# Patient Record
Sex: FEMALE | Race: OTHER | HISPANIC OR LATINO | URBAN - METROPOLITAN AREA
[De-identification: names, ages, dates, MRNs, and addresses within clinical notes are randomized per-mention and may not be internally consistent; named-entity substitution may affect disease eponyms.]

---

## 2017-06-13 ENCOUNTER — OUTPATIENT (OUTPATIENT)
Dept: OUTPATIENT SERVICES | Facility: HOSPITAL | Age: 47
LOS: 1 days | Discharge: HOME | End: 2017-06-13

## 2017-06-28 DIAGNOSIS — I10 ESSENTIAL (PRIMARY) HYPERTENSION: ICD-10-CM

## 2017-06-28 DIAGNOSIS — H44.133 SYMPATHETIC UVEITIS, BILATERAL: ICD-10-CM

## 2017-06-28 DIAGNOSIS — D25.9 LEIOMYOMA OF UTERUS, UNSPECIFIED: ICD-10-CM

## 2017-06-28 DIAGNOSIS — D50.9 IRON DEFICIENCY ANEMIA, UNSPECIFIED: ICD-10-CM

## 2017-06-28 DIAGNOSIS — F43.23 ADJUSTMENT DISORDER WITH MIXED ANXIETY AND DEPRESSED MOOD: ICD-10-CM

## 2017-08-31 ENCOUNTER — OUTPATIENT (OUTPATIENT)
Dept: OUTPATIENT SERVICES | Facility: HOSPITAL | Age: 47
LOS: 1 days | Discharge: HOME | End: 2017-08-31

## 2017-08-31 DIAGNOSIS — D50.9 IRON DEFICIENCY ANEMIA, UNSPECIFIED: ICD-10-CM

## 2017-08-31 DIAGNOSIS — M54.12 RADICULOPATHY, CERVICAL REGION: ICD-10-CM

## 2017-08-31 DIAGNOSIS — I10 ESSENTIAL (PRIMARY) HYPERTENSION: ICD-10-CM

## 2017-08-31 DIAGNOSIS — F43.23 ADJUSTMENT DISORDER WITH MIXED ANXIETY AND DEPRESSED MOOD: ICD-10-CM

## 2017-12-28 ENCOUNTER — OUTPATIENT (OUTPATIENT)
Dept: OUTPATIENT SERVICES | Facility: HOSPITAL | Age: 47
LOS: 1 days | Discharge: HOME | End: 2017-12-28

## 2017-12-28 DIAGNOSIS — M33.20 POLYMYOSITIS, ORGAN INVOLVEMENT UNSPECIFIED: ICD-10-CM

## 2017-12-28 DIAGNOSIS — E06.3 AUTOIMMUNE THYROIDITIS: ICD-10-CM

## 2017-12-28 DIAGNOSIS — E55.9 VITAMIN D DEFICIENCY, UNSPECIFIED: ICD-10-CM

## 2017-12-28 DIAGNOSIS — D50.8 OTHER IRON DEFICIENCY ANEMIAS: ICD-10-CM

## 2017-12-28 DIAGNOSIS — M31.30 WEGENER'S GRANULOMATOSIS WITHOUT RENAL INVOLVEMENT: ICD-10-CM

## 2017-12-28 DIAGNOSIS — M32.10 SYSTEMIC LUPUS ERYTHEMATOSUS, ORGAN OR SYSTEM INVOLVEMENT UNSPECIFIED: ICD-10-CM

## 2017-12-28 DIAGNOSIS — F43.23 ADJUSTMENT DISORDER WITH MIXED ANXIETY AND DEPRESSED MOOD: ICD-10-CM

## 2017-12-28 DIAGNOSIS — D86.9 SARCOIDOSIS, UNSPECIFIED: ICD-10-CM

## 2017-12-28 DIAGNOSIS — M79.1 MYALGIA: ICD-10-CM

## 2017-12-28 DIAGNOSIS — M06.4 INFLAMMATORY POLYARTHROPATHY: ICD-10-CM

## 2017-12-28 DIAGNOSIS — R80.9 PROTEINURIA, UNSPECIFIED: ICD-10-CM

## 2017-12-28 DIAGNOSIS — R79.89 OTHER SPECIFIED ABNORMAL FINDINGS OF BLOOD CHEMISTRY: ICD-10-CM

## 2017-12-28 DIAGNOSIS — I10 ESSENTIAL (PRIMARY) HYPERTENSION: ICD-10-CM

## 2017-12-28 DIAGNOSIS — M60.9 MYOSITIS, UNSPECIFIED: ICD-10-CM

## 2017-12-28 DIAGNOSIS — D84.1 DEFECTS IN THE COMPLEMENT SYSTEM: ICD-10-CM

## 2017-12-28 DIAGNOSIS — E21.3 HYPERPARATHYROIDISM, UNSPECIFIED: ICD-10-CM

## 2018-06-16 ENCOUNTER — EMERGENCY (EMERGENCY)
Facility: HOSPITAL | Age: 48
LOS: 0 days | Discharge: HOME | End: 2018-06-17
Attending: EMERGENCY MEDICINE | Admitting: EMERGENCY MEDICINE

## 2018-06-16 VITALS
HEART RATE: 94 BPM | SYSTOLIC BLOOD PRESSURE: 139 MMHG | RESPIRATION RATE: 20 BRPM | TEMPERATURE: 98 F | DIASTOLIC BLOOD PRESSURE: 71 MMHG | OXYGEN SATURATION: 98 %

## 2018-06-16 DIAGNOSIS — M79.7 FIBROMYALGIA: ICD-10-CM

## 2018-06-16 DIAGNOSIS — R10.13 EPIGASTRIC PAIN: ICD-10-CM

## 2018-06-16 DIAGNOSIS — F43.10 POST-TRAUMATIC STRESS DISORDER, UNSPECIFIED: ICD-10-CM

## 2018-06-16 DIAGNOSIS — K75.4 AUTOIMMUNE HEPATITIS: ICD-10-CM

## 2018-06-16 DIAGNOSIS — E78.5 HYPERLIPIDEMIA, UNSPECIFIED: ICD-10-CM

## 2018-06-16 DIAGNOSIS — I10 ESSENTIAL (PRIMARY) HYPERTENSION: ICD-10-CM

## 2018-06-16 DIAGNOSIS — Z82.49 FAMILY HISTORY OF ISCHEMIC HEART DISEASE AND OTHER DISEASES OF THE CIRCULATORY SYSTEM: ICD-10-CM

## 2018-06-16 DIAGNOSIS — E66.9 OBESITY, UNSPECIFIED: ICD-10-CM

## 2018-06-16 DIAGNOSIS — R10.9 UNSPECIFIED ABDOMINAL PAIN: ICD-10-CM

## 2018-06-16 LAB
ALBUMIN SERPL ELPH-MCNC: 4 G/DL — SIGNIFICANT CHANGE UP (ref 3.5–5.2)
ALP SERPL-CCNC: 67 U/L — SIGNIFICANT CHANGE UP (ref 30–115)
ALT FLD-CCNC: 26 U/L — SIGNIFICANT CHANGE UP (ref 0–41)
ANION GAP SERPL CALC-SCNC: 16 MMOL/L — HIGH (ref 7–14)
APPEARANCE UR: CLEAR — SIGNIFICANT CHANGE UP
AST SERPL-CCNC: 40 U/L — SIGNIFICANT CHANGE UP (ref 0–41)
BASE EXCESS BLDV CALC-SCNC: 2.8 MMOL/L — HIGH (ref -2–2)
BASOPHILS # BLD AUTO: 0.02 K/UL — SIGNIFICANT CHANGE UP (ref 0–0.2)
BASOPHILS NFR BLD AUTO: 0.2 % — SIGNIFICANT CHANGE UP (ref 0–1)
BILIRUB DIRECT SERPL-MCNC: <0.2 MG/DL — SIGNIFICANT CHANGE UP (ref 0–0.2)
BILIRUB INDIRECT FLD-MCNC: >0.2 MG/DL — SIGNIFICANT CHANGE UP (ref 0.2–1.2)
BILIRUB SERPL-MCNC: 0.4 MG/DL — SIGNIFICANT CHANGE UP (ref 0.2–1.2)
BILIRUB UR-MCNC: NEGATIVE — SIGNIFICANT CHANGE UP
BUN SERPL-MCNC: 12 MG/DL — SIGNIFICANT CHANGE UP (ref 10–20)
CALCIUM SERPL-MCNC: 9 MG/DL — SIGNIFICANT CHANGE UP (ref 8.5–10.1)
CHLORIDE SERPL-SCNC: 98 MMOL/L — SIGNIFICANT CHANGE UP (ref 98–110)
CO2 SERPL-SCNC: 22 MMOL/L — SIGNIFICANT CHANGE UP (ref 17–32)
COLOR SPEC: YELLOW — SIGNIFICANT CHANGE UP
CREAT SERPL-MCNC: 0.5 MG/DL — LOW (ref 0.7–1.5)
DIFF PNL FLD: (no result)
EOSINOPHIL # BLD AUTO: 0.22 K/UL — SIGNIFICANT CHANGE UP (ref 0–0.7)
EOSINOPHIL NFR BLD AUTO: 2 % — SIGNIFICANT CHANGE UP (ref 0–8)
GLUCOSE SERPL-MCNC: 88 MG/DL — SIGNIFICANT CHANGE UP (ref 70–99)
GLUCOSE UR QL: NEGATIVE MG/DL — SIGNIFICANT CHANGE UP
HCG UR QL: NEGATIVE — SIGNIFICANT CHANGE UP
HCO3 BLDV-SCNC: 27 MMOL/L — SIGNIFICANT CHANGE UP (ref 22–29)
HCT VFR BLD CALC: 36.7 % — LOW (ref 37–47)
HGB BLD-MCNC: 12.4 G/DL — SIGNIFICANT CHANGE UP (ref 12–16)
IMM GRANULOCYTES NFR BLD AUTO: 0.4 % — HIGH (ref 0.1–0.3)
KETONES UR-MCNC: NEGATIVE — SIGNIFICANT CHANGE UP
LACTATE BLDV-MCNC: 1.1 MMOL/L — SIGNIFICANT CHANGE UP (ref 0.5–1.6)
LEUKOCYTE ESTERASE UR-ACNC: NEGATIVE — SIGNIFICANT CHANGE UP
LYMPHOCYTES # BLD AUTO: 28.3 % — SIGNIFICANT CHANGE UP (ref 20.5–51.1)
LYMPHOCYTES # BLD AUTO: 3.1 K/UL — SIGNIFICANT CHANGE UP (ref 1.2–3.4)
MCHC RBC-ENTMCNC: 28.8 PG — SIGNIFICANT CHANGE UP (ref 27–31)
MCHC RBC-ENTMCNC: 33.8 G/DL — SIGNIFICANT CHANGE UP (ref 32–37)
MCV RBC AUTO: 85.3 FL — SIGNIFICANT CHANGE UP (ref 81–99)
MONOCYTES # BLD AUTO: 0.71 K/UL — HIGH (ref 0.1–0.6)
MONOCYTES NFR BLD AUTO: 6.5 % — SIGNIFICANT CHANGE UP (ref 1.7–9.3)
NEUTROPHILS # BLD AUTO: 6.87 K/UL — HIGH (ref 1.4–6.5)
NEUTROPHILS NFR BLD AUTO: 62.6 % — SIGNIFICANT CHANGE UP (ref 42.2–75.2)
NITRITE UR-MCNC: NEGATIVE — SIGNIFICANT CHANGE UP
NRBC # BLD: 0 /100 WBCS — SIGNIFICANT CHANGE UP (ref 0–0)
PCO2 BLDV: 40 MMHG — LOW (ref 41–51)
PH BLDV: 7.44 — HIGH (ref 7.26–7.43)
PH UR: 6.5 — SIGNIFICANT CHANGE UP (ref 5–8)
PLATELET # BLD AUTO: 299 K/UL — SIGNIFICANT CHANGE UP (ref 130–400)
PO2 BLDV: 58 MMHG — HIGH (ref 20–40)
POTASSIUM SERPL-MCNC: 4.5 MMOL/L — SIGNIFICANT CHANGE UP (ref 3.5–5)
POTASSIUM SERPL-SCNC: 4.5 MMOL/L — SIGNIFICANT CHANGE UP (ref 3.5–5)
PROT SERPL-MCNC: 7.2 G/DL — SIGNIFICANT CHANGE UP (ref 6–8)
PROT UR-MCNC: NEGATIVE MG/DL — SIGNIFICANT CHANGE UP
RBC # BLD: 4.3 M/UL — SIGNIFICANT CHANGE UP (ref 4.2–5.4)
RBC # FLD: 13.9 % — SIGNIFICANT CHANGE UP (ref 11.5–14.5)
SAO2 % BLDV: 92 % — SIGNIFICANT CHANGE UP
SODIUM SERPL-SCNC: 136 MMOL/L — SIGNIFICANT CHANGE UP (ref 135–146)
SP GR SPEC: 1.01 — SIGNIFICANT CHANGE UP (ref 1.01–1.03)
TROPONIN T SERPL-MCNC: <0.01 NG/ML — SIGNIFICANT CHANGE UP
UROBILINOGEN FLD QL: 0.2 MG/DL — SIGNIFICANT CHANGE UP (ref 0.2–0.2)
WBC # BLD: 10.96 K/UL — HIGH (ref 4.8–10.8)
WBC # FLD AUTO: 10.96 K/UL — HIGH (ref 4.8–10.8)

## 2018-06-16 RX ORDER — KETOROLAC TROMETHAMINE 30 MG/ML
15 SYRINGE (ML) INJECTION ONCE
Qty: 0 | Refills: 0 | Status: DISCONTINUED | OUTPATIENT
Start: 2018-06-16 | End: 2018-06-16

## 2018-06-16 RX ORDER — ONDANSETRON 8 MG/1
4 TABLET, FILM COATED ORAL ONCE
Qty: 0 | Refills: 0 | Status: COMPLETED | OUTPATIENT
Start: 2018-06-16 | End: 2018-06-16

## 2018-06-16 RX ORDER — FAMOTIDINE 10 MG/ML
20 INJECTION INTRAVENOUS ONCE
Qty: 0 | Refills: 0 | Status: COMPLETED | OUTPATIENT
Start: 2018-06-16 | End: 2018-06-16

## 2018-06-16 RX ORDER — METOCLOPRAMIDE HCL 10 MG
10 TABLET ORAL ONCE
Qty: 0 | Refills: 0 | Status: COMPLETED | OUTPATIENT
Start: 2018-06-16 | End: 2018-06-16

## 2018-06-16 RX ORDER — SODIUM CHLORIDE 9 MG/ML
1000 INJECTION INTRAMUSCULAR; INTRAVENOUS; SUBCUTANEOUS ONCE
Qty: 0 | Refills: 0 | Status: COMPLETED | OUTPATIENT
Start: 2018-06-16 | End: 2018-06-16

## 2018-06-16 RX ADMIN — SODIUM CHLORIDE 1000 MILLILITER(S): 9 INJECTION INTRAMUSCULAR; INTRAVENOUS; SUBCUTANEOUS at 19:55

## 2018-06-16 RX ADMIN — FAMOTIDINE 20 MILLIGRAM(S): 10 INJECTION INTRAVENOUS at 19:55

## 2018-06-16 RX ADMIN — Medication 15 MILLIGRAM(S): at 22:31

## 2018-06-16 RX ADMIN — Medication 10 MILLIGRAM(S): at 22:31

## 2018-06-16 RX ADMIN — Medication 15 MILLIGRAM(S): at 22:39

## 2018-06-16 RX ADMIN — ONDANSETRON 4 MILLIGRAM(S): 8 TABLET, FILM COATED ORAL at 19:55

## 2018-06-16 NOTE — ED PROVIDER NOTE - PHYSICAL EXAMINATION
Constitutional: Well developed, well nourished. NAD  Head: Normocephalic, atraumatic.  Eyes: PERRL, EOMI.  ENT: No nasal discharge. Mucous membranes dry.  Neck: Supple. Painless ROM.  Cardiovascular: Regular rate and rhythm.   Pulmonary: . Lungs clear to auscultation bilaterally.   Abdominal: Soft. + mild epigastric abdominal pain; no rebound, guarding or flank pain  Extremities. Pelvis stable. No lower extremity edema, symmetric calves.  Skin: No rashes, cyanosis.  Neuro: AAOx3. No focal neurological deficits.  Psych: Normal mood. Normal affect.

## 2018-06-16 NOTE — ED PROVIDER NOTE - CARE PLAN
Principal Discharge DX:	Chest pain, unspecified type  Secondary Diagnosis:	Cholelithiasis  Secondary Diagnosis:	Headache

## 2018-06-16 NOTE — ED ADULT TRIAGE NOTE - CHIEF COMPLAINT QUOTE
Pt c/o dry heaves x months that have been getting worse, headache and dizziness today, pain when swallowing x 1 week

## 2018-06-16 NOTE — ED ADULT NURSE NOTE - PMH
Autoimmune hepatitis    Chronic lower back pain    DJD (degenerative joint disease)    Fibromyalgia    HLD (hyperlipidemia)    HTN (hypertension)    PTSD (post-traumatic stress disorder)

## 2018-06-16 NOTE — ED PROVIDER NOTE - MEDICAL DECISION MAKING DETAILS
Pt with continued CP.  Possibly gastritis, but due to the continued pain and no recent stress test, will have her stay in EDOU for continued workup for low risk CP.

## 2018-06-16 NOTE — ED PROVIDER NOTE - ATTENDING CONTRIBUTION TO CARE
48 y/o F GERD, htn, fibromyalgia, chronic LBP, hchol, autoimmune hepatitis - 1 week of epigastric pain.  n/v and dizziness.  +headache. 48 y/o F GERD, htn, fibromyalgia, chronic LBP, hchol, autoimmune hepatitis - 1 week of epigastric pain.  n/v and dizziness today, felt like she was going to pass out.  +headache.  Pain radiates up into her chest. No sob, no cough. no fever.  Pt has been on nexium in the past, but has not been on it recently.  No h/o early MI in family  Pain is worse with drinking even something like water.  GI Aboudajade in the past.  EXAM: well appearing. NAD. s1s2, reg. CTAB. abd soft, nd, nt.  P: labs, ekg, cxr, RUQ US.  CT if pain continues.  GI meds.

## 2018-06-16 NOTE — ED PROVIDER NOTE - OBJECTIVE STATEMENT
47 yold female to Ed Pmhx Fibromyalgia, HTn Hld, autoimmune hepatitis, chronic LBP/DDD, uterine fibroids, Gerd c/o abdominal pain described as burning with n/v x 1 week; pain exacerbated with po intake and food seems to get stuck epigastric area; pt also c/o headache frontal x 1 day; pt denies fever, chills, melena, brpbp;

## 2018-06-16 NOTE — ED PROVIDER NOTE - NS ED ROS FT
Constitutional: No fever, chills.  Eyes: No visual changes.  ENT: No hearing changes.  Neck: No neck pain or stiffness.  Cardiovascular: No chest pain, palpitations, edema.  Pulmonary: No SOB, cough. No hemoptysis.  Abdominal:  see hPI  : No dysuria, frequency.  Neuro: see hpi  MS: No back pain. No calf pain/swelling.  Psych: No suicidal ideations.

## 2018-06-17 VITALS
RESPIRATION RATE: 18 BRPM | OXYGEN SATURATION: 98 % | DIASTOLIC BLOOD PRESSURE: 78 MMHG | TEMPERATURE: 98 F | SYSTOLIC BLOOD PRESSURE: 133 MMHG | HEART RATE: 83 BPM

## 2018-06-17 LAB
BACTERIA # UR AUTO: (no result) /HPF
CHOLEST SERPL-MCNC: 235 MG/DL — HIGH (ref 100–200)
CK MB CFR SERPL CALC: 1.5 NG/ML — SIGNIFICANT CHANGE UP (ref 0.6–6.3)
CK SERPL-CCNC: 76 U/L — SIGNIFICANT CHANGE UP (ref 0–225)
EPI CELLS # UR: (no result) /HPF
HDLC SERPL-MCNC: 37 MG/DL — LOW (ref 40–125)
LIPID PNL WITH DIRECT LDL SERPL: 174 MG/DL — HIGH (ref 4–129)
RBC CASTS # UR COMP ASSIST: (no result) /HPF
TOTAL CHOLESTEROL/HDL RATIO MEASUREMENT: 6.4 RATIO — HIGH (ref 4–5.5)
TRIGL SERPL-MCNC: 223 MG/DL — HIGH (ref 10–149)
TROPONIN T SERPL-MCNC: <0.01 NG/ML — SIGNIFICANT CHANGE UP
WBC UR QL: SIGNIFICANT CHANGE UP /HPF

## 2018-06-17 RX ORDER — ASPIRIN/CALCIUM CARB/MAGNESIUM 324 MG
325 TABLET ORAL ONCE
Qty: 0 | Refills: 0 | Status: COMPLETED | OUTPATIENT
Start: 2018-06-17 | End: 2018-06-17

## 2018-06-17 RX ORDER — METHOCARBAMOL 500 MG/1
500 TABLET, FILM COATED ORAL ONCE
Qty: 0 | Refills: 0 | Status: COMPLETED | OUTPATIENT
Start: 2018-06-17 | End: 2018-06-17

## 2018-06-17 RX ORDER — TRAZODONE HCL 50 MG
50 TABLET ORAL ONCE
Qty: 0 | Refills: 0 | Status: COMPLETED | OUTPATIENT
Start: 2018-06-17 | End: 2018-06-17

## 2018-06-17 RX ORDER — PRAZOSIN HCL 2 MG
1 CAPSULE ORAL ONCE
Qty: 0 | Refills: 0 | Status: COMPLETED | OUTPATIENT
Start: 2018-06-17 | End: 2018-06-17

## 2018-06-17 RX ORDER — OMEPRAZOLE 10 MG/1
1 CAPSULE, DELAYED RELEASE ORAL
Qty: 30 | Refills: 0
Start: 2018-06-17 | End: 2018-07-16

## 2018-06-17 RX ORDER — DIPHENHYDRAMINE HYDROCHLORIDE AND LIDOCAINE HYDROCHLORIDE AND ALUMINUM HYDROXIDE AND MAGNESIUM HYDRO
30 KIT ONCE
Qty: 0 | Refills: 0 | Status: COMPLETED | OUTPATIENT
Start: 2018-06-17 | End: 2018-06-17

## 2018-06-17 RX ADMIN — Medication 1 MILLIGRAM(S): at 04:26

## 2018-06-17 RX ADMIN — Medication 50 MILLIGRAM(S): at 04:26

## 2018-06-17 RX ADMIN — METHOCARBAMOL 500 MILLIGRAM(S): 500 TABLET, FILM COATED ORAL at 03:37

## 2018-06-17 RX ADMIN — Medication 325 MILLIGRAM(S): at 04:26

## 2018-06-17 RX ADMIN — DIPHENHYDRAMINE HYDROCHLORIDE AND LIDOCAINE HYDROCHLORIDE AND ALUMINUM HYDROXIDE AND MAGNESIUM HYDRO 30 MILLILITER(S): KIT at 03:37

## 2018-06-17 NOTE — ED ADULT NURSE REASSESSMENT NOTE - NS ED NURSE REASSESS COMMENT FT1
pt resting in bed, nad noted. Pt on continuous cardiac and o2 monitor. pt stated she feels relief of symptoms. poc explained to the pt, pt verb understanding. will continue to monitor.

## 2018-06-17 NOTE — ED CDU PROVIDER DISPOSITION NOTE - CLINICAL COURSE
48 yo woman h/o multiple medical problems including HTN, HLD, obesity, autoimmune hepatitis, fibromyalgia, PTSD/anxiety was placed in CDU for possible cardiac evaluation. Pt presented to the ED c/o epigastric pain with post prandial bloating that has been going on for several months, worse when she eats carbs. She also has been having a lot of "dry heaves" which are mostly associated with coughing. She does not regurgitate food or other stomach contents. Epigastric pain worsened over the past week, nonradiating, no associated constipation, diarrhea, urinary sx. No chest pain, no SOB. No exertional sx. Prior to coming to the ED pt c/o frontal HA and dizziness, which resolved spontaneously. Pt was eval in the ED with EKG, labs with cardiac enzymes, CXR, abd CT, and RUQ sonogram. All were unremarkable except for sonogram, which revealed gallstones but no cholecystitis. VS, exam as noted. Pt was monitored in CDU without incident, repeat EKG and enzymes unchanged. I spoke extensively with pt this morning and she gave the above history and strongly indicated that she feels sx are likely to be from GI. She does have a strong FH of CAD, but these sx do not sound cardiac in origin. She has a PMD and a GI (Dr Goodrich and Dr Hernandez). She understands that ddx includes GERD vs PUD vs esophageal structure vs other etiologies and that EGD is likely next step and agrees that cardiac workup seems unwarranted at this time. Omeprazole was prescribed. She will return to the ED for worsening sx.

## 2018-06-17 NOTE — ED ADULT NURSE REASSESSMENT NOTE - NS ED NURSE REASSESS COMMENT FT1
Pt assessed. AAO x 4. Pt resting comfortably in bed. No distress noted. Denies CP, SOB at this time. Pt on continuous cardiac and pulse ox monitoring. Comfort measures in place. Safety measures maintained. Will continue to monitor.

## 2019-06-24 PROBLEM — I10 ESSENTIAL (PRIMARY) HYPERTENSION: Chronic | Status: ACTIVE | Noted: 2018-06-17

## 2019-06-24 PROBLEM — E78.5 HYPERLIPIDEMIA, UNSPECIFIED: Chronic | Status: ACTIVE | Noted: 2018-06-17

## 2019-06-24 PROBLEM — F43.10 POST-TRAUMATIC STRESS DISORDER, UNSPECIFIED: Chronic | Status: ACTIVE | Noted: 2018-06-17

## 2019-06-24 PROBLEM — M19.90 UNSPECIFIED OSTEOARTHRITIS, UNSPECIFIED SITE: Chronic | Status: ACTIVE | Noted: 2018-06-17

## 2019-06-24 PROBLEM — M79.7 FIBROMYALGIA: Chronic | Status: ACTIVE | Noted: 2018-06-17

## 2019-07-01 ENCOUNTER — OUTPATIENT (OUTPATIENT)
Dept: OUTPATIENT SERVICES | Facility: HOSPITAL | Age: 49
LOS: 1 days | End: 2019-07-01
Payer: MEDICAID

## 2019-07-01 PROCEDURE — G9001: CPT

## 2019-07-11 DIAGNOSIS — Z71.89 OTHER SPECIFIED COUNSELING: ICD-10-CM

## 2019-07-11 PROBLEM — Z00.00 ENCOUNTER FOR PREVENTIVE HEALTH EXAMINATION: Status: ACTIVE | Noted: 2019-07-11

## 2019-07-15 ENCOUNTER — OUTPATIENT (OUTPATIENT)
Dept: OUTPATIENT SERVICES | Facility: HOSPITAL | Age: 49
LOS: 1 days | Discharge: HOME | End: 2019-07-15

## 2019-07-15 DIAGNOSIS — F43.23 ADJUSTMENT DISORDER WITH MIXED ANXIETY AND DEPRESSED MOOD: ICD-10-CM

## 2019-07-15 DIAGNOSIS — I10 ESSENTIAL (PRIMARY) HYPERTENSION: ICD-10-CM

## 2019-07-15 DIAGNOSIS — M54.12 RADICULOPATHY, CERVICAL REGION: ICD-10-CM

## 2019-07-15 DIAGNOSIS — M79.7 FIBROMYALGIA: ICD-10-CM

## 2019-07-15 DIAGNOSIS — M54.16 RADICULOPATHY, LUMBAR REGION: ICD-10-CM

## 2019-09-19 ENCOUNTER — OUTPATIENT (OUTPATIENT)
Dept: OUTPATIENT SERVICES | Facility: HOSPITAL | Age: 49
LOS: 1 days | Discharge: HOME | End: 2019-09-19

## 2019-09-19 ENCOUNTER — APPOINTMENT (OUTPATIENT)
Dept: OBGYN | Facility: CLINIC | Age: 49
End: 2019-09-19
Payer: MEDICAID

## 2019-09-19 VITALS
WEIGHT: 252 LBS | SYSTOLIC BLOOD PRESSURE: 138 MMHG | DIASTOLIC BLOOD PRESSURE: 88 MMHG | BODY MASS INDEX: 40.5 KG/M2 | HEIGHT: 66 IN

## 2019-09-19 DIAGNOSIS — E78.00 PURE HYPERCHOLESTEROLEMIA, UNSPECIFIED: ICD-10-CM

## 2019-09-19 DIAGNOSIS — D25.9 LEIOMYOMA OF UTERUS, UNSPECIFIED: ICD-10-CM

## 2019-09-19 DIAGNOSIS — F32.9 MAJOR DEPRESSIVE DISORDER, SINGLE EPISODE, UNSPECIFIED: ICD-10-CM

## 2019-09-19 DIAGNOSIS — K75.4 AUTOIMMUNE HEPATITIS: ICD-10-CM

## 2019-09-19 DIAGNOSIS — M79.7 FIBROMYALGIA: ICD-10-CM

## 2019-09-19 DIAGNOSIS — H20.9 UNSPECIFIED IRIDOCYCLITIS: ICD-10-CM

## 2019-09-19 DIAGNOSIS — I10 ESSENTIAL (PRIMARY) HYPERTENSION: ICD-10-CM

## 2019-09-19 DIAGNOSIS — F40.01 AGORAPHOBIA WITH PANIC DISORDER: ICD-10-CM

## 2019-09-19 DIAGNOSIS — F41.0 PANIC DISORDER [EPISODIC PAROXYSMAL ANXIETY]: ICD-10-CM

## 2019-09-19 PROCEDURE — 99204 OFFICE O/P NEW MOD 45 MIN: CPT

## 2019-09-19 NOTE — PHYSICAL EXAM
[Awake] : awake [Alert] : alert [Acute Distress] : no acute distress [Mass] : no breast mass [Nipple Discharge] : no nipple discharge [Axillary LAD] : no axillary lymphadenopathy [Soft] : soft [Tender] : non tender [Oriented x3] : oriented to person, place, and time [Normal] : cervix [No Bleeding] : there was no active vaginal bleeding [Enlarged ___ wks] : enlarged [unfilled] ~Uweeks [Uterine Adnexae] : were not tender and not enlarged [FreeTextEntry7] : Difficult to examine due to body habitus.

## 2019-09-22 LAB
ALBUMIN SERPL ELPH-MCNC: 4.2 G/DL
ALP BLD-CCNC: 87 U/L
ALT SERPL-CCNC: 13 U/L
ANION GAP SERPL CALC-SCNC: 14 MMOL/L
AST SERPL-CCNC: 14 U/L
BASOPHILS # BLD AUTO: 0.03 K/UL
BASOPHILS NFR BLD AUTO: 0.4 %
BILIRUB SERPL-MCNC: 0.4 MG/DL
BUN SERPL-MCNC: 11 MG/DL
CALCIUM SERPL-MCNC: 9.1 MG/DL
CHLORIDE SERPL-SCNC: 101 MMOL/L
CO2 SERPL-SCNC: 23 MMOL/L
CREAT SERPL-MCNC: 0.6 MG/DL
EOSINOPHIL # BLD AUTO: 0.19 K/UL
EOSINOPHIL NFR BLD AUTO: 2.3 %
ESTRADIOL SERPL-MCNC: 244 PG/ML
FSH SERPL-MCNC: 4.2 IU/L
GLUCOSE SERPL-MCNC: 92 MG/DL
HCT VFR BLD CALC: 36.2 %
HGB BLD-MCNC: 11 G/DL
IMM GRANULOCYTES NFR BLD AUTO: 0.2 %
LYMPHOCYTES # BLD AUTO: 2.35 K/UL
LYMPHOCYTES NFR BLD AUTO: 28 %
MAN DIFF?: NORMAL
MCHC RBC-ENTMCNC: 25 PG
MCHC RBC-ENTMCNC: 30.4 G/DL
MCV RBC AUTO: 82.3 FL
MONOCYTES # BLD AUTO: 0.38 K/UL
MONOCYTES NFR BLD AUTO: 4.5 %
NEUTROPHILS # BLD AUTO: 5.42 K/UL
NEUTROPHILS NFR BLD AUTO: 64.6 %
PLATELET # BLD AUTO: 372 K/UL
POTASSIUM SERPL-SCNC: 4.1 MMOL/L
PROLACTIN SERPL-MCNC: 9.7 NG/ML
PROT SERPL-MCNC: 7.2 G/DL
RBC # BLD: 4.4 M/UL
RBC # FLD: 15.8 %
SODIUM SERPL-SCNC: 138 MMOL/L
T4 FREE SERPL-MCNC: 0.9 NG/DL
TSH SERPL-ACNC: 0.89 UIU/ML
WBC # FLD AUTO: 8.39 K/UL

## 2019-09-23 LAB — ANA SER IF-ACNC: NEGATIVE

## 2019-10-01 LAB — HPV HIGH+LOW RISK DNA PNL CVX: NOT DETECTED

## 2019-10-13 ENCOUNTER — FORM ENCOUNTER (OUTPATIENT)
Age: 49
End: 2019-10-13

## 2019-10-14 ENCOUNTER — OUTPATIENT (OUTPATIENT)
Dept: OUTPATIENT SERVICES | Facility: HOSPITAL | Age: 49
LOS: 1 days | Discharge: HOME | End: 2019-10-14
Payer: MEDICAID

## 2019-10-14 DIAGNOSIS — Z12.31 ENCOUNTER FOR SCREENING MAMMOGRAM FOR MALIGNANT NEOPLASM OF BREAST: ICD-10-CM

## 2019-10-14 DIAGNOSIS — D25.9 LEIOMYOMA OF UTERUS, UNSPECIFIED: ICD-10-CM

## 2019-10-14 PROCEDURE — 77067 SCR MAMMO BI INCL CAD: CPT | Mod: 26

## 2019-10-14 PROCEDURE — 77063 BREAST TOMOSYNTHESIS BI: CPT | Mod: 26

## 2019-10-14 PROCEDURE — 76856 US EXAM PELVIC COMPLETE: CPT | Mod: 26

## 2019-10-14 PROCEDURE — 76830 TRANSVAGINAL US NON-OB: CPT | Mod: 26

## 2019-10-17 ENCOUNTER — APPOINTMENT (OUTPATIENT)
Dept: OBGYN | Facility: CLINIC | Age: 49
End: 2019-10-17
Payer: MEDICAID

## 2019-10-17 DIAGNOSIS — D50.8 OTHER IRON DEFICIENCY ANEMIAS: ICD-10-CM

## 2019-10-17 PROCEDURE — 99213 OFFICE O/P EST LOW 20 MIN: CPT

## 2019-10-24 ENCOUNTER — OUTPATIENT (OUTPATIENT)
Dept: OUTPATIENT SERVICES | Facility: HOSPITAL | Age: 49
LOS: 1 days | Discharge: HOME | End: 2019-10-24
Payer: MEDICAID

## 2019-10-24 VITALS
DIASTOLIC BLOOD PRESSURE: 80 MMHG | TEMPERATURE: 98 F | SYSTOLIC BLOOD PRESSURE: 140 MMHG | OXYGEN SATURATION: 97 % | HEART RATE: 76 BPM | WEIGHT: 253.53 LBS | HEIGHT: 65 IN | RESPIRATION RATE: 16 BRPM

## 2019-10-24 DIAGNOSIS — N92.0 EXCESSIVE AND FREQUENT MENSTRUATION WITH REGULAR CYCLE: ICD-10-CM

## 2019-10-24 DIAGNOSIS — Z98.891 HISTORY OF UTERINE SCAR FROM PREVIOUS SURGERY: Chronic | ICD-10-CM

## 2019-10-24 DIAGNOSIS — Z01.818 ENCOUNTER FOR OTHER PREPROCEDURAL EXAMINATION: ICD-10-CM

## 2019-10-24 DIAGNOSIS — Z98.890 OTHER SPECIFIED POSTPROCEDURAL STATES: Chronic | ICD-10-CM

## 2019-10-24 DIAGNOSIS — D25.9 LEIOMYOMA OF UTERUS, UNSPECIFIED: ICD-10-CM

## 2019-10-24 LAB
APPEARANCE UR: CLEAR — SIGNIFICANT CHANGE UP
BACTERIA # UR AUTO: NEGATIVE — SIGNIFICANT CHANGE UP
BILIRUB UR-MCNC: NEGATIVE — SIGNIFICANT CHANGE UP
COLOR SPEC: YELLOW — SIGNIFICANT CHANGE UP
DIFF PNL FLD: ABNORMAL
EPI CELLS # UR: 1 /HPF — SIGNIFICANT CHANGE UP (ref 0–5)
GLUCOSE UR QL: NEGATIVE — SIGNIFICANT CHANGE UP
HYALINE CASTS # UR AUTO: 0 /LPF — SIGNIFICANT CHANGE UP (ref 0–7)
KETONES UR-MCNC: SIGNIFICANT CHANGE UP
LEUKOCYTE ESTERASE UR-ACNC: NEGATIVE — SIGNIFICANT CHANGE UP
NITRITE UR-MCNC: NEGATIVE — SIGNIFICANT CHANGE UP
PH UR: 6 — SIGNIFICANT CHANGE UP (ref 5–8)
PROT UR-MCNC: SIGNIFICANT CHANGE UP
RBC CASTS # UR COMP ASSIST: 10 /HPF — HIGH (ref 0–4)
SP GR SPEC: 1.03 — HIGH (ref 1.01–1.02)
UROBILINOGEN FLD QL: SIGNIFICANT CHANGE UP
WBC UR QL: 1 /HPF — SIGNIFICANT CHANGE UP (ref 0–5)

## 2019-10-24 PROCEDURE — 93010 ELECTROCARDIOGRAM REPORT: CPT

## 2019-10-24 NOTE — H&P PST ADULT - HISTORY OF PRESENT ILLNESS
48YR OLD FEMALE STATES WAS DIAGNOSED WITH FIBROID UTERUS-HAS BEEN HAVING PELVIC PAIN AND HEAVY MENSTRUAL BLEEDING -PRESENTS FOR D&C AND ENDOMETRIAL BIOPSY DUE TO HER FAMILY HISTORY. IS ALSO SCHEDULED FOR "RT HAND SURGERY" DUE TO LTD FLEXION ON RT THUMB AND "POSSIBLE CARPAL TUNNEL"  RELEASE ON 11/4? RECENT LABS SEP 2019 IN HIE -WNL. Denies c/o CP, PALP, SOB, URI, FEVER, RASH OR UTI SYMPTOMS. Exercise melissa 1-2 flat blocks LTD by pain back, and LT LE-USES CANE. NEEDS ASSIST WITH TRANSFERS.

## 2019-10-24 NOTE — H&P PST ADULT - NSICDXFAMILYHX_GEN_ALL_CORE_FT
FAMILY HISTORY:  Mother  Still living? No  Family history of acute myocardial infarction, Age at diagnosis: 41-50    Sibling  Still living? Unknown  Family history of CHF (congestive heart failure), Age at diagnosis: Age Unknown    Aunt  Still living? Unknown  Family history of early CAD, Age at diagnosis: 51-60

## 2019-10-24 NOTE — H&P PST ADULT - NSICDXPASTMEDICALHX_GEN_ALL_CORE_FT
PAST MEDICAL HISTORY:  Anxiety PTSD    Autoimmune hepatitis IN REMISSION FOR AT LEAST 3-4YRS    Chronic neck and back pain     Depression     DJD (degenerative joint disease)     Fibromyalgia     HLD (hyperlipidemia)     HTN (hypertension)     Obesity BMI 42    PTSD (post-traumatic stress disorder)     Uveitis B/L WITH CATARACTS

## 2019-10-29 PROBLEM — E66.9 OBESITY, UNSPECIFIED: Chronic | Status: ACTIVE | Noted: 2019-10-24

## 2019-10-29 PROBLEM — H20.9 UNSPECIFIED IRIDOCYCLITIS: Chronic | Status: ACTIVE | Noted: 2019-10-24

## 2019-10-29 PROBLEM — K75.4 AUTOIMMUNE HEPATITIS: Chronic | Status: ACTIVE | Noted: 2018-06-17

## 2019-10-29 PROBLEM — M54.9 DORSALGIA, UNSPECIFIED: Chronic | Status: ACTIVE | Noted: 2019-10-24

## 2019-10-29 PROBLEM — F41.9 ANXIETY DISORDER, UNSPECIFIED: Chronic | Status: ACTIVE | Noted: 2019-10-24

## 2019-10-29 PROBLEM — F32.9 MAJOR DEPRESSIVE DISORDER, SINGLE EPISODE, UNSPECIFIED: Chronic | Status: ACTIVE | Noted: 2019-10-24

## 2019-11-01 NOTE — ASU PATIENT PROFILE, ADULT - PMH
Anxiety  PTSD  Autoimmune hepatitis  IN REMISSION FOR AT LEAST 3-4YRS  Chronic neck and back pain    Depression    DJD (degenerative joint disease)    Fibromyalgia    HLD (hyperlipidemia)    HTN (hypertension)    Obesity  BMI 42  PTSD (post-traumatic stress disorder)    Uveitis  B/L WITH CATARACTS

## 2019-11-04 ENCOUNTER — OUTPATIENT (OUTPATIENT)
Dept: OUTPATIENT SERVICES | Facility: HOSPITAL | Age: 49
LOS: 1 days | Discharge: HOME | End: 2019-11-04

## 2019-11-04 VITALS
HEART RATE: 86 BPM | OXYGEN SATURATION: 99 % | SYSTOLIC BLOOD PRESSURE: 119 MMHG | DIASTOLIC BLOOD PRESSURE: 72 MMHG | RESPIRATION RATE: 18 BRPM | TEMPERATURE: 98 F

## 2019-11-04 VITALS
RESPIRATION RATE: 18 BRPM | DIASTOLIC BLOOD PRESSURE: 63 MMHG | HEART RATE: 85 BPM | OXYGEN SATURATION: 100 % | TEMPERATURE: 98 F | WEIGHT: 253.53 LBS | HEIGHT: 65 IN | SYSTOLIC BLOOD PRESSURE: 138 MMHG

## 2019-11-04 DIAGNOSIS — Z98.891 HISTORY OF UTERINE SCAR FROM PREVIOUS SURGERY: Chronic | ICD-10-CM

## 2019-11-04 DIAGNOSIS — Z98.890 OTHER SPECIFIED POSTPROCEDURAL STATES: Chronic | ICD-10-CM

## 2019-11-06 DIAGNOSIS — M65.311 TRIGGER THUMB, RIGHT THUMB: ICD-10-CM

## 2019-11-06 DIAGNOSIS — Z88.0 ALLERGY STATUS TO PENICILLIN: ICD-10-CM

## 2019-11-06 DIAGNOSIS — M79.7 FIBROMYALGIA: ICD-10-CM

## 2019-11-06 DIAGNOSIS — F32.9 MAJOR DEPRESSIVE DISORDER, SINGLE EPISODE, UNSPECIFIED: ICD-10-CM

## 2019-11-06 DIAGNOSIS — E78.5 HYPERLIPIDEMIA, UNSPECIFIED: ICD-10-CM

## 2019-11-06 DIAGNOSIS — I10 ESSENTIAL (PRIMARY) HYPERTENSION: ICD-10-CM

## 2019-11-06 DIAGNOSIS — Z82.49 FAMILY HISTORY OF ISCHEMIC HEART DISEASE AND OTHER DISEASES OF THE CIRCULATORY SYSTEM: ICD-10-CM

## 2019-11-06 DIAGNOSIS — E66.9 OBESITY, UNSPECIFIED: ICD-10-CM

## 2019-11-06 DIAGNOSIS — M54.2 CERVICALGIA: ICD-10-CM

## 2019-11-06 DIAGNOSIS — M19.90 UNSPECIFIED OSTEOARTHRITIS, UNSPECIFIED SITE: ICD-10-CM

## 2019-11-06 DIAGNOSIS — H20.9 UNSPECIFIED IRIDOCYCLITIS: ICD-10-CM

## 2019-11-06 DIAGNOSIS — G89.29 OTHER CHRONIC PAIN: ICD-10-CM

## 2019-11-06 DIAGNOSIS — F41.9 ANXIETY DISORDER, UNSPECIFIED: ICD-10-CM

## 2019-11-06 DIAGNOSIS — Z87.891 PERSONAL HISTORY OF NICOTINE DEPENDENCE: ICD-10-CM

## 2019-11-06 DIAGNOSIS — F43.10 POST-TRAUMATIC STRESS DISORDER, UNSPECIFIED: ICD-10-CM

## 2019-11-06 DIAGNOSIS — M54.9 DORSALGIA, UNSPECIFIED: ICD-10-CM

## 2019-11-21 ENCOUNTER — OUTPATIENT (OUTPATIENT)
Dept: OUTPATIENT SERVICES | Facility: HOSPITAL | Age: 49
LOS: 1 days | Discharge: HOME | End: 2019-11-21

## 2019-11-21 VITALS
DIASTOLIC BLOOD PRESSURE: 70 MMHG | OXYGEN SATURATION: 98 % | HEIGHT: 65 IN | SYSTOLIC BLOOD PRESSURE: 121 MMHG | WEIGHT: 246.92 LBS | RESPIRATION RATE: 18 BRPM | TEMPERATURE: 98 F | HEART RATE: 77 BPM

## 2019-11-21 DIAGNOSIS — D25.9 LEIOMYOMA OF UTERUS, UNSPECIFIED: ICD-10-CM

## 2019-11-21 DIAGNOSIS — N92.0 EXCESSIVE AND FREQUENT MENSTRUATION WITH REGULAR CYCLE: ICD-10-CM

## 2019-11-21 DIAGNOSIS — Z98.891 HISTORY OF UTERINE SCAR FROM PREVIOUS SURGERY: Chronic | ICD-10-CM

## 2019-11-21 DIAGNOSIS — Z01.818 ENCOUNTER FOR OTHER PREPROCEDURAL EXAMINATION: ICD-10-CM

## 2019-11-21 DIAGNOSIS — Z98.890 OTHER SPECIFIED POSTPROCEDURAL STATES: Chronic | ICD-10-CM

## 2019-11-21 LAB
ALBUMIN SERPL ELPH-MCNC: 4.5 G/DL — SIGNIFICANT CHANGE UP (ref 3.5–5.2)
ALP SERPL-CCNC: 91 U/L — SIGNIFICANT CHANGE UP (ref 30–115)
ALT FLD-CCNC: 12 U/L — SIGNIFICANT CHANGE UP (ref 0–41)
ANION GAP SERPL CALC-SCNC: 14 MMOL/L — SIGNIFICANT CHANGE UP (ref 7–14)
APPEARANCE UR: CLEAR — SIGNIFICANT CHANGE UP
APTT BLD: 30.7 SEC — SIGNIFICANT CHANGE UP (ref 27–39.2)
AST SERPL-CCNC: 16 U/L — SIGNIFICANT CHANGE UP (ref 0–41)
BASOPHILS # BLD AUTO: 0.02 K/UL — SIGNIFICANT CHANGE UP (ref 0–0.2)
BASOPHILS NFR BLD AUTO: 0.2 % — SIGNIFICANT CHANGE UP (ref 0–1)
BILIRUB SERPL-MCNC: 0.3 MG/DL — SIGNIFICANT CHANGE UP (ref 0.2–1.2)
BILIRUB UR-MCNC: NEGATIVE — SIGNIFICANT CHANGE UP
BUN SERPL-MCNC: 10 MG/DL — SIGNIFICANT CHANGE UP (ref 10–20)
CALCIUM SERPL-MCNC: 9.1 MG/DL — SIGNIFICANT CHANGE UP (ref 8.5–10.1)
CHLORIDE SERPL-SCNC: 101 MMOL/L — SIGNIFICANT CHANGE UP (ref 98–110)
CO2 SERPL-SCNC: 23 MMOL/L — SIGNIFICANT CHANGE UP (ref 17–32)
COLOR SPEC: SIGNIFICANT CHANGE UP
CREAT SERPL-MCNC: 0.5 MG/DL — LOW (ref 0.7–1.5)
DIFF PNL FLD: SIGNIFICANT CHANGE UP
EOSINOPHIL # BLD AUTO: 0.16 K/UL — SIGNIFICANT CHANGE UP (ref 0–0.7)
EOSINOPHIL NFR BLD AUTO: 2 % — SIGNIFICANT CHANGE UP (ref 0–8)
GLUCOSE SERPL-MCNC: 86 MG/DL — SIGNIFICANT CHANGE UP (ref 70–99)
GLUCOSE UR QL: NEGATIVE — SIGNIFICANT CHANGE UP
HCT VFR BLD CALC: 34.6 % — LOW (ref 37–47)
HGB BLD-MCNC: 10.5 G/DL — LOW (ref 12–16)
IMM GRANULOCYTES NFR BLD AUTO: 0.1 % — SIGNIFICANT CHANGE UP (ref 0.1–0.3)
INR BLD: 1.08 RATIO — SIGNIFICANT CHANGE UP (ref 0.65–1.3)
KETONES UR-MCNC: NEGATIVE — SIGNIFICANT CHANGE UP
LEUKOCYTE ESTERASE UR-ACNC: NEGATIVE — SIGNIFICANT CHANGE UP
LYMPHOCYTES # BLD AUTO: 2.21 K/UL — SIGNIFICANT CHANGE UP (ref 1.2–3.4)
LYMPHOCYTES # BLD AUTO: 27.5 % — SIGNIFICANT CHANGE UP (ref 20.5–51.1)
MCHC RBC-ENTMCNC: 24.9 PG — LOW (ref 27–31)
MCHC RBC-ENTMCNC: 30.3 G/DL — LOW (ref 32–37)
MCV RBC AUTO: 82.2 FL — SIGNIFICANT CHANGE UP (ref 81–99)
MONOCYTES # BLD AUTO: 0.44 K/UL — SIGNIFICANT CHANGE UP (ref 0.1–0.6)
MONOCYTES NFR BLD AUTO: 5.5 % — SIGNIFICANT CHANGE UP (ref 1.7–9.3)
NEUTROPHILS # BLD AUTO: 5.19 K/UL — SIGNIFICANT CHANGE UP (ref 1.4–6.5)
NEUTROPHILS NFR BLD AUTO: 64.7 % — SIGNIFICANT CHANGE UP (ref 42.2–75.2)
NITRITE UR-MCNC: NEGATIVE — SIGNIFICANT CHANGE UP
NRBC # BLD: 0 /100 WBCS — SIGNIFICANT CHANGE UP (ref 0–0)
PH UR: 6.5 — SIGNIFICANT CHANGE UP (ref 5–8)
PLATELET # BLD AUTO: 349 K/UL — SIGNIFICANT CHANGE UP (ref 130–400)
POTASSIUM SERPL-MCNC: 4.2 MMOL/L — SIGNIFICANT CHANGE UP (ref 3.5–5)
POTASSIUM SERPL-SCNC: 4.2 MMOL/L — SIGNIFICANT CHANGE UP (ref 3.5–5)
PROT SERPL-MCNC: 7.7 G/DL — SIGNIFICANT CHANGE UP (ref 6–8)
PROT UR-MCNC: NEGATIVE — SIGNIFICANT CHANGE UP
PROTHROM AB SERPL-ACNC: 12.4 SEC — SIGNIFICANT CHANGE UP (ref 9.95–12.87)
RBC # BLD: 4.21 M/UL — SIGNIFICANT CHANGE UP (ref 4.2–5.4)
RBC # FLD: 17.4 % — HIGH (ref 11.5–14.5)
SODIUM SERPL-SCNC: 138 MMOL/L — SIGNIFICANT CHANGE UP (ref 135–146)
SP GR SPEC: 1.02 — SIGNIFICANT CHANGE UP (ref 1.01–1.02)
UROBILINOGEN FLD QL: SIGNIFICANT CHANGE UP
WBC # BLD: 8.03 K/UL — SIGNIFICANT CHANGE UP (ref 4.8–10.8)
WBC # FLD AUTO: 8.03 K/UL — SIGNIFICANT CHANGE UP (ref 4.8–10.8)

## 2019-11-21 NOTE — H&P PST ADULT - VENOUS THROMBOEMBOLISM HISTORY
Anesthesia Volume In Cc: 2.5 Add Intralesional Injection: No Total Volume (Ccs): 1 Size Of Lesion After Curettage: 0 Anesthesia Type: 1% lidocaine with epinephrine Consent was obtained from the patient. The risks, benefits and alternatives to therapy were discussed in detail. Specifically, the risks of infection, scarring, bleeding, prolonged wound healing, nerve injury, incomplete removal, allergy to anesthesia and recurrence were addressed. Alternatives to ED&C, such as: surgical removal and XRT were also discussed.  Prior to the procedure, the treatment site was clearly identified and confirmed by the patient. All components of Universal Protocol/PAUSE Rule completed. Cautery Type: cryotherapy Post-Care Instructions: I reviewed with the patient in detail post-care instructions. Patient is to keep the area dry for 48 hours, and not to engage in any swimming until the area is healed. Should the patient develop any fevers, chills, bleeding, severe pain patient will contact the office immediately. Number Of Curettages: 3 What Was Performed First?: Curettage Detail Level: Detailed Additional Information: (Optional): The wound was cleaned, and a pressure dressing was applied.  The patient received detailed post-op instructions. Concentration (Mg/Ml Or Millions Of Plaque Forming Units/Cc): 0.01 Bill As A Line Item Or As Units: Line Item (0) indicator not present

## 2019-11-21 NOTE — H&P PST ADULT - HISTORY OF PRESENT ILLNESS
Pt was initially scheduled to have above procedure done on 11/6/19 but had to postpone it bc she had right trigger finger surgery done on 11/4/19 and her recovery time took longer than expected. PATIENT CURRENTLY DENIES CHEST PAIN  SHORTNESS OF BREATH  PALPITATIONS,  DYSURIA, OR UPPER RESPIRATORY INFECTION IN PAST WEEK. HAD URI WHICH FULLY RESOLVED 3 DAYS AGO. SHE HAS AN EXERCISE  TOLERANCE  OF LESS THAN 1 FLIGHT OF STAIRS WITHOUT SHORTNESS OF BREATH. SHE HAS DIFFICULTY WITH AMBULATION D/T FIBROMYALGIA.

## 2019-12-06 ENCOUNTER — RESULT REVIEW (OUTPATIENT)
Age: 49
End: 2019-12-06

## 2019-12-06 ENCOUNTER — OUTPATIENT (OUTPATIENT)
Dept: OUTPATIENT SERVICES | Facility: HOSPITAL | Age: 49
LOS: 1 days | Discharge: HOME | End: 2019-12-06
Payer: MEDICAID

## 2019-12-06 VITALS
OXYGEN SATURATION: 98 % | HEART RATE: 85 BPM | TEMPERATURE: 98 F | DIASTOLIC BLOOD PRESSURE: 75 MMHG | RESPIRATION RATE: 22 BRPM | SYSTOLIC BLOOD PRESSURE: 130 MMHG

## 2019-12-06 VITALS
HEART RATE: 93 BPM | OXYGEN SATURATION: 98 % | HEIGHT: 65 IN | DIASTOLIC BLOOD PRESSURE: 86 MMHG | RESPIRATION RATE: 16 BRPM | WEIGHT: 246.92 LBS | SYSTOLIC BLOOD PRESSURE: 123 MMHG | TEMPERATURE: 99 F

## 2019-12-06 DIAGNOSIS — Z98.891 HISTORY OF UTERINE SCAR FROM PREVIOUS SURGERY: Chronic | ICD-10-CM

## 2019-12-06 DIAGNOSIS — Z98.890 OTHER SPECIFIED POSTPROCEDURAL STATES: Chronic | ICD-10-CM

## 2019-12-06 PROCEDURE — 58558 HYSTEROSCOPY BIOPSY: CPT

## 2019-12-06 PROCEDURE — 88305 TISSUE EXAM BY PATHOLOGIST: CPT | Mod: 26

## 2019-12-06 RX ORDER — OXYCODONE AND ACETAMINOPHEN 5; 325 MG/1; MG/1
1 TABLET ORAL ONCE
Refills: 0 | Status: DISCONTINUED | OUTPATIENT
Start: 2019-12-06 | End: 2019-12-06

## 2019-12-06 RX ORDER — HYDROMORPHONE HYDROCHLORIDE 2 MG/ML
0.4 INJECTION INTRAMUSCULAR; INTRAVENOUS; SUBCUTANEOUS
Refills: 0 | Status: DISCONTINUED | OUTPATIENT
Start: 2019-12-06 | End: 2019-12-06

## 2019-12-06 RX ORDER — SODIUM CHLORIDE 9 MG/ML
1000 INJECTION, SOLUTION INTRAVENOUS
Refills: 0 | Status: DISCONTINUED | OUTPATIENT
Start: 2019-12-06 | End: 2019-12-28

## 2019-12-06 RX ORDER — ONDANSETRON 8 MG/1
4 TABLET, FILM COATED ORAL ONCE
Refills: 0 | Status: DISCONTINUED | OUTPATIENT
Start: 2019-12-06 | End: 2019-12-28

## 2019-12-06 RX ADMIN — SODIUM CHLORIDE 100 MILLILITER(S): 9 INJECTION, SOLUTION INTRAVENOUS at 13:08

## 2019-12-06 RX ADMIN — OXYCODONE AND ACETAMINOPHEN 1 TABLET(S): 5; 325 TABLET ORAL at 14:30

## 2019-12-06 NOTE — BRIEF OPERATIVE NOTE - NSICDXBRIEFPOSTOP_GEN_ALL_CORE_FT
POST-OP DIAGNOSIS:  Uterine polyp 06-Dec-2019 13:02:44  Mazin Murray  Excessive menstruation 06-Dec-2019 13:02:26  Mazin Murray

## 2019-12-06 NOTE — BRIEF OPERATIVE NOTE - OPERATION/FINDINGS
Mildly enlarged anteverted uterus. No obvious submucous myomas. Thickened fluffy endometrium noted with multiple polypoid projections. Both ostia visualized.

## 2019-12-06 NOTE — ASU DISCHARGE PLAN (ADULT/PEDIATRIC) - CARE PROVIDER_API CALL
Mazin Murray)  Obstetrics and Gynecology  [address] [place] [address]  Phone: [phone]  Fax: [phone]  Follow Up Time: 2 weeks

## 2019-12-06 NOTE — BRIEF OPERATIVE NOTE - NSICDXBRIEFPROCEDURE_GEN_ALL_CORE_FT
PROCEDURES:  Hysteroscopy with dilation and curettage of uterus using MyoSure device 06-Dec-2019 13:01:52  Mazin Murray

## 2019-12-06 NOTE — BRIEF OPERATIVE NOTE - NSICDXBRIEFPREOP_GEN_ALL_CORE_FT
PRE-OP DIAGNOSIS:  Submucous myoma of uterus 06-Dec-2019 13:02:13  Mazin Murray  Excessive menstruation 06-Dec-2019 13:02:05  Mazin Murray

## 2019-12-10 LAB — SURGICAL PATHOLOGY STUDY: SIGNIFICANT CHANGE UP

## 2019-12-11 DIAGNOSIS — F41.9 ANXIETY DISORDER, UNSPECIFIED: ICD-10-CM

## 2019-12-11 DIAGNOSIS — N84.0 POLYP OF CORPUS UTERI: ICD-10-CM

## 2019-12-11 DIAGNOSIS — K75.4 AUTOIMMUNE HEPATITIS: ICD-10-CM

## 2019-12-11 DIAGNOSIS — E66.9 OBESITY, UNSPECIFIED: ICD-10-CM

## 2019-12-11 DIAGNOSIS — F32.9 MAJOR DEPRESSIVE DISORDER, SINGLE EPISODE, UNSPECIFIED: ICD-10-CM

## 2019-12-11 DIAGNOSIS — N92.0 EXCESSIVE AND FREQUENT MENSTRUATION WITH REGULAR CYCLE: ICD-10-CM

## 2019-12-11 DIAGNOSIS — E78.5 HYPERLIPIDEMIA, UNSPECIFIED: ICD-10-CM

## 2019-12-11 DIAGNOSIS — N85.01 BENIGN ENDOMETRIAL HYPERPLASIA: ICD-10-CM

## 2019-12-11 DIAGNOSIS — I10 ESSENTIAL (PRIMARY) HYPERTENSION: ICD-10-CM

## 2019-12-19 ENCOUNTER — APPOINTMENT (OUTPATIENT)
Dept: OBGYN | Facility: CLINIC | Age: 49
End: 2019-12-19
Payer: MEDICAID

## 2019-12-19 PROCEDURE — 99213 OFFICE O/P EST LOW 20 MIN: CPT

## 2020-05-14 ENCOUNTER — APPOINTMENT (OUTPATIENT)
Dept: OBGYN | Facility: CLINIC | Age: 50
End: 2020-05-14

## 2020-08-13 ENCOUNTER — APPOINTMENT (OUTPATIENT)
Dept: OBGYN | Facility: CLINIC | Age: 50
End: 2020-08-13

## 2021-04-22 ENCOUNTER — APPOINTMENT (OUTPATIENT)
Dept: OPHTHALMOLOGY | Facility: CLINIC | Age: 51
End: 2021-04-22
Payer: MEDICAID

## 2021-04-22 ENCOUNTER — NON-APPOINTMENT (OUTPATIENT)
Age: 51
End: 2021-04-22

## 2021-04-22 PROCEDURE — 99072 ADDL SUPL MATRL&STAF TM PHE: CPT

## 2021-04-22 PROCEDURE — 92004 COMPRE OPH EXAM NEW PT 1/>: CPT

## 2021-07-22 ENCOUNTER — NON-APPOINTMENT (OUTPATIENT)
Age: 51
End: 2021-07-22

## 2021-07-22 ENCOUNTER — APPOINTMENT (OUTPATIENT)
Dept: OPHTHALMOLOGY | Facility: CLINIC | Age: 51
End: 2021-07-22
Payer: MEDICAID

## 2021-07-22 PROCEDURE — 92012 INTRM OPH EXAM EST PATIENT: CPT

## 2021-07-22 PROCEDURE — 92134 CPTRZ OPH DX IMG PST SGM RTA: CPT

## 2021-08-19 ENCOUNTER — APPOINTMENT (OUTPATIENT)
Dept: OPHTHALMOLOGY | Facility: CLINIC | Age: 51
End: 2021-08-19
Payer: MEDICAID

## 2021-08-19 ENCOUNTER — NON-APPOINTMENT (OUTPATIENT)
Age: 51
End: 2021-08-19

## 2021-08-19 PROCEDURE — 92012 INTRM OPH EXAM EST PATIENT: CPT

## 2021-09-16 ENCOUNTER — NON-APPOINTMENT (OUTPATIENT)
Age: 51
End: 2021-09-16

## 2021-09-16 ENCOUNTER — APPOINTMENT (OUTPATIENT)
Dept: OPHTHALMOLOGY | Facility: CLINIC | Age: 51
End: 2021-09-16
Payer: MEDICAID

## 2021-09-16 PROCEDURE — 92014 COMPRE OPH EXAM EST PT 1/>: CPT

## 2021-11-18 ENCOUNTER — APPOINTMENT (OUTPATIENT)
Dept: OBGYN | Facility: CLINIC | Age: 51
End: 2021-11-18
Payer: MEDICAID

## 2021-11-18 VITALS
HEIGHT: 66 IN | WEIGHT: 264.5 LBS | SYSTOLIC BLOOD PRESSURE: 122 MMHG | DIASTOLIC BLOOD PRESSURE: 84 MMHG | BODY MASS INDEX: 42.51 KG/M2

## 2021-11-18 DIAGNOSIS — Z01.419 ENCOUNTER FOR GYNECOLOGICAL EXAMINATION (GENERAL) (ROUTINE) W/OUT ABNORMAL FINDINGS: ICD-10-CM

## 2021-11-18 PROCEDURE — 99396 PREV VISIT EST AGE 40-64: CPT

## 2021-11-18 PROCEDURE — 99213 OFFICE O/P EST LOW 20 MIN: CPT | Mod: 25

## 2021-11-18 RX ORDER — MEDROXYPROGESTERONE ACETATE 10 MG/1
10 TABLET ORAL DAILY
Qty: 10 | Refills: 11 | Status: DISCONTINUED | COMMUNITY
Start: 2019-12-19 | End: 2021-11-18

## 2021-11-18 NOTE — HISTORY OF PRESENT ILLNESS
[FreeTextEntry1] : Patient here for annual and for f/u of her simply endometrial hyperplasia. She is s/p DC/Hyst in 2019. She took progesterone but stopped about 1 year ago. She is still getting her periods and they are occasionally heavy.\par \par Pmhx: panic d/o/agrophobia, major depressive disorder, PTSD (see therapist weekly and psychiatrist monthly). Autoimmune hepatitis, uveitis, fibromylagia, rediculopathy, bulging cervical,lumbar discs, htn, trigger finger, high cholesterol\par \par Meds: Olanzapine 2.5mg, lexapro 25mg, trazadone 50mg, tizandine, gabapentin, caudal injections, coritsone injections, lisinopril, hctxz, lovastatin, famotidine, \par \par surg hx: c/s.\par \par gynhx: no abnormal paps, No Stds. h/o fibroids x 10 years. Endo Hyperpalsia\par \par social: doesn't work. has hha. no etoh, ex smoker\par \par obhx: 2 , 1 c/s (twins)

## 2021-11-18 NOTE — PHYSICAL EXAM
[Chaperone Present] : A chaperone was present in the examining room during all aspects of the physical examination [Appropriately responsive] : appropriately responsive [Alert] : alert [No Acute Distress] : no acute distress [No Murmurs] : no murmurs [Soft] : soft [Non-tender] : non-tender [Non-distended] : non-distended [No HSM] : No HSM [No Lesions] : no lesions [No Mass] : no mass [Oriented x3] : oriented x3 [FreeTextEntry2] : obese [Examination Of The Breasts] : a normal appearance [No Masses] : no breast masses were palpable [Labia Majora] : normal [Labia Minora] : normal [Normal] : normal [Uterine Adnexae] : normal

## 2021-11-18 NOTE — PLAN
[FreeTextEntry1] : Periods now not as heavy and regular.\par Stopped progesterone about a 1 year ago.\par Emb done\par pap/hpv done\par Mammo\par TV/TA sono\par Mammo\par Blood tests.\par Will call with results.

## 2021-11-29 LAB
ALBUMIN SERPL ELPH-MCNC: 4.3 G/DL
ALP BLD-CCNC: 94 U/L
ALT SERPL-CCNC: 19 U/L
ANION GAP SERPL CALC-SCNC: 14 MMOL/L
AST SERPL-CCNC: 33 U/L
BASOPHILS # BLD AUTO: 0.03 K/UL
BASOPHILS NFR BLD AUTO: 0.3 %
BILIRUB SERPL-MCNC: 0.3 MG/DL
BUN SERPL-MCNC: 20 MG/DL
CALCIUM SERPL-MCNC: 9.2 MG/DL
CHLORIDE SERPL-SCNC: 101 MMOL/L
CO2 SERPL-SCNC: 22 MMOL/L
CORE LAB BIOPSY: NORMAL
CREAT SERPL-MCNC: 0.8 MG/DL
EOSINOPHIL # BLD AUTO: 0.2 K/UL
EOSINOPHIL NFR BLD AUTO: 1.8 %
ESTIMATED AVERAGE GLUCOSE: 134 MG/DL
ESTRADIOL SERPL-MCNC: 154 PG/ML
FSH SERPL-MCNC: 2.4 IU/L
GLUCOSE SERPL-MCNC: 90 MG/DL
HBA1C MFR BLD HPLC: 6.3 %
HCT VFR BLD CALC: 40.2 %
HGB BLD-MCNC: 12.3 G/DL
HPV HIGH+LOW RISK DNA PNL CVX: NOT DETECTED
IMM GRANULOCYTES NFR BLD AUTO: 0.3 %
LYMPHOCYTES # BLD AUTO: 3.09 K/UL
LYMPHOCYTES NFR BLD AUTO: 27.5 %
MAN DIFF?: NORMAL
MCHC RBC-ENTMCNC: 26.7 PG
MCHC RBC-ENTMCNC: 30.6 G/DL
MCV RBC AUTO: 87.4 FL
MONOCYTES # BLD AUTO: 0.9 K/UL
MONOCYTES NFR BLD AUTO: 8 %
NEUTROPHILS # BLD AUTO: 6.99 K/UL
NEUTROPHILS NFR BLD AUTO: 62.1 %
PLATELET # BLD AUTO: 363 K/UL
POTASSIUM SERPL-SCNC: 4.6 MMOL/L
PROT SERPL-MCNC: 7.7 G/DL
RBC # BLD: 4.6 M/UL
RBC # FLD: 15.1 %
SODIUM SERPL-SCNC: 137 MMOL/L
T4 FREE SERPL-MCNC: 1 NG/DL
TSH SERPL-ACNC: 1.51 UIU/ML
WBC # FLD AUTO: 11.24 K/UL

## 2021-12-02 ENCOUNTER — APPOINTMENT (OUTPATIENT)
Dept: OPHTHALMOLOGY | Facility: CLINIC | Age: 51
End: 2021-12-02

## 2021-12-05 LAB — CYTOLOGY CVX/VAG DOC THIN PREP: ABNORMAL

## 2021-12-06 ENCOUNTER — NON-APPOINTMENT (OUTPATIENT)
Age: 51
End: 2021-12-06

## 2021-12-09 ENCOUNTER — APPOINTMENT (OUTPATIENT)
Dept: OBGYN | Facility: CLINIC | Age: 51
End: 2021-12-09
Payer: MEDICAID

## 2021-12-09 PROCEDURE — 99213 OFFICE O/P EST LOW 20 MIN: CPT

## 2021-12-12 NOTE — HISTORY OF PRESENT ILLNESS
[FreeTextEntry1] : Patient here for results and to discuss further management.\par Patient has h/o endometrial hyperplasia. She was seen for annual few weeks ago - she is still getting her periods and they are occasional ly heavy. I repeated and EMB which showed endometrial hyperplasia again - simple with focal complex features. She also did her blood tests - her hemoglobin is 12.3. Her A1c is 6.3 which is prediabetic range. Her FSH is 2.4 - she is not menopausal yet.

## 2021-12-12 NOTE — PLAN
[FreeTextEntry1] : 51 year old with heavy periods. History of endometrial hyperplasia. Now with some complex features.\par Prediabetic. Obese.\par \par Given all these iissues - I am recommending a hysterectomy - RATLH/BSO\par \par Will repeat sono and make final decision.

## 2022-01-06 ENCOUNTER — RESULT REVIEW (OUTPATIENT)
Age: 52
End: 2022-01-06

## 2022-01-06 ENCOUNTER — OUTPATIENT (OUTPATIENT)
Dept: OUTPATIENT SERVICES | Facility: HOSPITAL | Age: 52
LOS: 1 days | Discharge: HOME | End: 2022-01-06
Payer: MEDICAID

## 2022-01-06 VITALS
HEART RATE: 94 BPM | WEIGHT: 264.33 LBS | OXYGEN SATURATION: 98 % | HEIGHT: 65 IN | RESPIRATION RATE: 16 BRPM | DIASTOLIC BLOOD PRESSURE: 64 MMHG | SYSTOLIC BLOOD PRESSURE: 123 MMHG | TEMPERATURE: 98 F

## 2022-01-06 DIAGNOSIS — Z01.818 ENCOUNTER FOR OTHER PREPROCEDURAL EXAMINATION: ICD-10-CM

## 2022-01-06 DIAGNOSIS — D25.9 LEIOMYOMA OF UTERUS, UNSPECIFIED: ICD-10-CM

## 2022-01-06 DIAGNOSIS — Z98.891 HISTORY OF UTERINE SCAR FROM PREVIOUS SURGERY: Chronic | ICD-10-CM

## 2022-01-06 DIAGNOSIS — Z98.890 OTHER SPECIFIED POSTPROCEDURAL STATES: Chronic | ICD-10-CM

## 2022-01-06 LAB
ALBUMIN SERPL ELPH-MCNC: 4.5 G/DL — SIGNIFICANT CHANGE UP (ref 3.5–5.2)
ALP SERPL-CCNC: 111 U/L — SIGNIFICANT CHANGE UP (ref 30–115)
ALT FLD-CCNC: 19 U/L — SIGNIFICANT CHANGE UP (ref 0–41)
ANION GAP SERPL CALC-SCNC: 18 MMOL/L — HIGH (ref 7–14)
APPEARANCE UR: ABNORMAL
APTT BLD: 33.6 SEC — SIGNIFICANT CHANGE UP (ref 27–39.2)
AST SERPL-CCNC: 21 U/L — SIGNIFICANT CHANGE UP (ref 0–41)
BACTERIA # UR AUTO: ABNORMAL
BASOPHILS # BLD AUTO: 0.02 K/UL — SIGNIFICANT CHANGE UP (ref 0–0.2)
BASOPHILS NFR BLD AUTO: 0.2 % — SIGNIFICANT CHANGE UP (ref 0–1)
BILIRUB SERPL-MCNC: 0.6 MG/DL — SIGNIFICANT CHANGE UP (ref 0.2–1.2)
BILIRUB UR-MCNC: NEGATIVE — SIGNIFICANT CHANGE UP
BLD GP AB SCN SERPL QL: SIGNIFICANT CHANGE UP
BUN SERPL-MCNC: 15 MG/DL — SIGNIFICANT CHANGE UP (ref 10–20)
CALCIUM SERPL-MCNC: 9.7 MG/DL — SIGNIFICANT CHANGE UP (ref 8.5–10.1)
CHLORIDE SERPL-SCNC: 98 MMOL/L — SIGNIFICANT CHANGE UP (ref 98–110)
CO2 SERPL-SCNC: 19 MMOL/L — SIGNIFICANT CHANGE UP (ref 17–32)
COLOR SPEC: YELLOW — SIGNIFICANT CHANGE UP
CREAT SERPL-MCNC: 0.8 MG/DL — SIGNIFICANT CHANGE UP (ref 0.7–1.5)
DIFF PNL FLD: NEGATIVE — SIGNIFICANT CHANGE UP
EOSINOPHIL # BLD AUTO: 0.14 K/UL — SIGNIFICANT CHANGE UP (ref 0–0.7)
EOSINOPHIL NFR BLD AUTO: 1.3 % — SIGNIFICANT CHANGE UP (ref 0–8)
EPI CELLS # UR: 16 /HPF — HIGH (ref 0–5)
GLUCOSE SERPL-MCNC: 79 MG/DL — SIGNIFICANT CHANGE UP (ref 70–99)
GLUCOSE UR QL: NEGATIVE — SIGNIFICANT CHANGE UP
HCT VFR BLD CALC: 40 % — SIGNIFICANT CHANGE UP (ref 37–47)
HGB BLD-MCNC: 12.7 G/DL — SIGNIFICANT CHANGE UP (ref 12–16)
HYALINE CASTS # UR AUTO: 4 /LPF — SIGNIFICANT CHANGE UP (ref 0–7)
IMM GRANULOCYTES NFR BLD AUTO: 0.3 % — SIGNIFICANT CHANGE UP (ref 0.1–0.3)
INR BLD: 1.1 RATIO — SIGNIFICANT CHANGE UP (ref 0.65–1.3)
KETONES UR-MCNC: SIGNIFICANT CHANGE UP
LEUKOCYTE ESTERASE UR-ACNC: NEGATIVE — SIGNIFICANT CHANGE UP
LYMPHOCYTES # BLD AUTO: 29.5 % — SIGNIFICANT CHANGE UP (ref 20.5–51.1)
LYMPHOCYTES # BLD AUTO: 3.19 K/UL — SIGNIFICANT CHANGE UP (ref 1.2–3.4)
MCHC RBC-ENTMCNC: 26.6 PG — LOW (ref 27–31)
MCHC RBC-ENTMCNC: 31.8 G/DL — LOW (ref 32–37)
MCV RBC AUTO: 83.9 FL — SIGNIFICANT CHANGE UP (ref 81–99)
MONOCYTES # BLD AUTO: 0.56 K/UL — SIGNIFICANT CHANGE UP (ref 0.1–0.6)
MONOCYTES NFR BLD AUTO: 5.2 % — SIGNIFICANT CHANGE UP (ref 1.7–9.3)
NEUTROPHILS # BLD AUTO: 6.89 K/UL — HIGH (ref 1.4–6.5)
NEUTROPHILS NFR BLD AUTO: 63.5 % — SIGNIFICANT CHANGE UP (ref 42.2–75.2)
NITRITE UR-MCNC: NEGATIVE — SIGNIFICANT CHANGE UP
NRBC # BLD: 0 /100 WBCS — SIGNIFICANT CHANGE UP (ref 0–0)
PH UR: 5.5 — SIGNIFICANT CHANGE UP (ref 5–8)
PLATELET # BLD AUTO: 368 K/UL — SIGNIFICANT CHANGE UP (ref 130–400)
POTASSIUM SERPL-MCNC: 4.3 MMOL/L — SIGNIFICANT CHANGE UP (ref 3.5–5)
POTASSIUM SERPL-SCNC: 4.3 MMOL/L — SIGNIFICANT CHANGE UP (ref 3.5–5)
PROT SERPL-MCNC: 8.1 G/DL — HIGH (ref 6–8)
PROT UR-MCNC: ABNORMAL
PROTHROM AB SERPL-ACNC: 12.6 SEC — SIGNIFICANT CHANGE UP (ref 9.95–12.87)
RBC # BLD: 4.77 M/UL — SIGNIFICANT CHANGE UP (ref 4.2–5.4)
RBC # FLD: 14.9 % — HIGH (ref 11.5–14.5)
RBC CASTS # UR COMP ASSIST: 37 /HPF — HIGH (ref 0–4)
SODIUM SERPL-SCNC: 135 MMOL/L — SIGNIFICANT CHANGE UP (ref 135–146)
SP GR SPEC: 1.03 — SIGNIFICANT CHANGE UP (ref 1.01–1.03)
UROBILINOGEN FLD QL: SIGNIFICANT CHANGE UP
WBC # BLD: 10.83 K/UL — HIGH (ref 4.8–10.8)
WBC # FLD AUTO: 10.83 K/UL — HIGH (ref 4.8–10.8)
WBC UR QL: 4 /HPF — SIGNIFICANT CHANGE UP (ref 0–5)

## 2022-01-06 PROCEDURE — 71046 X-RAY EXAM CHEST 2 VIEWS: CPT | Mod: 26

## 2022-01-06 PROCEDURE — 93010 ELECTROCARDIOGRAM REPORT: CPT

## 2022-01-06 RX ORDER — TRAZODONE HCL 50 MG
1 TABLET ORAL
Qty: 0 | Refills: 0 | DISCHARGE

## 2022-01-06 RX ORDER — ESCITALOPRAM OXALATE 10 MG/1
25 TABLET, FILM COATED ORAL
Qty: 0 | Refills: 0 | DISCHARGE

## 2022-01-06 RX ORDER — FERROUS SULFATE 325(65) MG
1 TABLET ORAL
Qty: 0 | Refills: 0 | DISCHARGE

## 2022-01-06 RX ORDER — LISINOPRIL 2.5 MG/1
1 TABLET ORAL
Qty: 0 | Refills: 0 | DISCHARGE

## 2022-01-06 RX ORDER — TIZANIDINE 4 MG/1
1 TABLET ORAL
Qty: 0 | Refills: 0 | DISCHARGE

## 2022-01-06 RX ORDER — LOVASTATIN 20 MG
1 TABLET ORAL
Qty: 0 | Refills: 0 | DISCHARGE

## 2022-01-06 RX ORDER — ESCITALOPRAM OXALATE 10 MG/1
1 TABLET, FILM COATED ORAL
Qty: 0 | Refills: 0 | DISCHARGE

## 2022-01-06 RX ORDER — OMEGA-3 ACID ETHYL ESTERS 1 G
1 CAPSULE ORAL
Qty: 0 | Refills: 0 | DISCHARGE

## 2022-01-06 RX ORDER — GABAPENTIN 400 MG/1
1 CAPSULE ORAL
Qty: 0 | Refills: 0 | DISCHARGE

## 2022-01-06 RX ORDER — OLANZAPINE 15 MG/1
1 TABLET, FILM COATED ORAL
Qty: 0 | Refills: 0 | DISCHARGE

## 2022-01-06 RX ORDER — MIRTAZAPINE 45 MG/1
1 TABLET, ORALLY DISINTEGRATING ORAL
Qty: 0 | Refills: 0 | DISCHARGE

## 2022-01-06 RX ORDER — FAMOTIDINE 10 MG/ML
1 INJECTION INTRAVENOUS
Qty: 0 | Refills: 0 | DISCHARGE

## 2022-01-06 RX ORDER — DICLOFENAC SODIUM 75 MG/1
1 TABLET, DELAYED RELEASE ORAL
Qty: 0 | Refills: 0 | DISCHARGE

## 2022-01-06 NOTE — H&P PST ADULT - NSICDXPASTMEDICALHX_GEN_ALL_CORE_FT
PAST MEDICAL HISTORY:  Anxiety PTSD    Autoimmune hepatitis IN REMISSION FOR AT LEAST 3-4YRS    Chronic neck and back pain     Depression     DJD (degenerative joint disease)     Fibromyalgia     H/O sciatica     History of endometrial hyperplasia     HLD (hyperlipidemia)     HTN (hypertension)     Obesity BMI 42    PTSD (post-traumatic stress disorder)     Uveitis B/L WITH CATARACTS

## 2022-01-06 NOTE — H&P PST ADULT - NSANTHBMIRD_ENT_A_CORE
Consent (Marginal Mandibular)/Introductory Paragraph: The rationale for Mohs was explained to the patient and consent was obtained. The risks, benefits and alternatives to therapy were discussed in detail. Specifically, the risks of damage to the marginal mandibular branch of the facial nerve, infection, scarring, bleeding, prolonged wound healing, incomplete removal, allergy to anesthesia, and recurrence were addressed. Prior to the procedure, the treatment site was clearly identified and confirmed by the patient. All components of Universal Protocol/PAUSE Rule completed. Yes

## 2022-01-06 NOTE — H&P PST ADULT - REASON FOR ADMISSION
52 y/o female presents to PAST in preparation for robotic assisted total laparoscopic hysterectomy, bilateral salpingooophorectomy, possible diagnositc cystoscopy, in [facility] under [place] with Dr. Murray on 1/18/21

## 2022-01-06 NOTE — H&P PST ADULT - NSANTHGENDERRD_ENT_A_CORE
I, Troy Dowd, performed the initial face to face bedside interview with this patient regarding history of present illness, review of symptoms and relevant past medical, social and family history.  I completed an independent physical examination.  I was the initial provider who evaluated this patient. I have signed out the follow up of any pending tests (i.e. labs, radiological studies) to the ACP.  I have communicated the patient’s plan of care and disposition with the ACP. No

## 2022-01-06 NOTE — H&P PST ADULT - NSANTHOSAYNRD_GEN_A_CORE
Leg Swelling in Both Legs    Swelling of the feet, ankles, and legs is called edema. It is caused by excess fluid that has collected in the tissues. Extra fluid in the body settles in the lowest part because of gravity. This is why the legs and feet are most affected.  Some of the causes for edema include:  · Disease of the heart like congestive heart failure  · Standing or sitting for long periods of time. Sitting with your legs in the down position may do this.  · Infection of the feet or legs  · Blood pooling in the veins of your legs (venous insufficiency)  · Dilated veins in your lower leg (varicose veins)  · Garters or other clothing that is tight on your legs. This will cause blood to pool in your legs because the clothing limits blood flow.  · Some medicines. These include hormones like birth control pills. They also include some blood pressure medicines like calcium channel blockers and steroids. Other medicines include some antidepressants like MAO inhibitors and tricyclics.  · Menstrual periods that cause you to retain fluids  · Many types of renal disease  · Liver failure or cirrhosis  · Pregnancy. Some swelling is normal, but a sudden increase in leg swelling or weight gain can be a sign of a dangerous complication of pregnancy..  · Poor nutrition  Medical treatment will depend on what is causing the swelling in your legs. Your health care provider may prescribe water pills (diuretics) to get rid of the extra fluid.  Home care  Follow these guidelines when caring for yourself at home:  · Don't wear clothing like garters that is tight on your legs.  · Keep your legs up while lying or sitting.  · If infection, injury, or recent surgery is causing the swelling, stay off your legs as much as possible until symptoms get better.  · If your health care provider says that your leg swelling is caused by venous insufficiency or varicose veins, don't sit or  one place for long periods of time. Take  breaks and walk about every few hours. Brisk walking is a good exercise. It helps circulate the blood that has collected in your leg. Talk with your provider about using support stockings to stop daytime leg swelling.  · If your provider says that heart disease is causing your leg swelling, follow a low-salt diet to stop extra fluid from staying in your body.  Follow-up care  Follow up with your health care provider, or as advised.  When to seek medical advice  Call your health care provider right away if any of these occur:  · New shortness of breath or chest pain  · Shortness of breath or chest pain that gets worse  · Swelling in both legs or ankles that gets worse  · Swelling of the abdomen  · Redness, warmth, or swelling in one leg  · Fever of 100.4ºF (38ºC) or higher, or as directed by your health care provider  · Yellow color to your skin or eyes  · Rapid, unexplained weight gain     © 1808-3842 [employer]. [address] [place] [address]. All rights reserved. This information is not intended as a substitute for professional medical care. Always follow your healthcare professional's instructions.          Osteoarthritis  Osteoarthritis (also called degenerative joint disease) happens when the cartilage in a joint becomes damaged and worn. This may be due to age, wear and tear, overuse of the joint, or other problems. Osteoarthritis can affect any joint. But it is most common in hands, knees, spine, hips, and feet. Symptoms include joint stiffness, pain, and swelling.  Home care  · When a joint is more sore than usual, rest it for a day or two.  · Heat can help relieve stiffness. Take a hot bath or apply a heating pad for up to 30 minutes at a time. If symptoms are worse in the morning, using heat just after awakening can help relax the muscle and soothe the joints.   · Ice helps relieve pain and swelling. It is often used after activity. Use a cold pack wrapped in a thin cloth on the  joint for 10-15 minutes at a time.   · Alternating hot and cold can also help relieve pain. Try this for 20 minutes at a time, several times per day.  · Exercise helps prevent the muscles and ligaments around the joint from becoming weak. It also helps maintain function in the joint.  Be as active as you can. Talk to your doctor about what activity program is best for you.  · Excess weight puts a lot of extra strain on weight-bearing joints of the lower back, hips, knees, feet and ankles. If you are overweight, talk to your doctor about a safe and effective weight loss program.  · Use anti-inflammatory medications as prescribed for pain. This incudes acetaminophen or NSAIDs such as ibuprofen or naproxen. If needed, topical or injected medications may be recommended. Talk to your doctor if these options are not enough to manage your pain.  · Talk with your doctor about devices that might help improve your function and reduce pain.  Follow-up care  Follow up with your doctor as advised by our staff.  When to seek medical attention  Call your doctor right away if any of the following occur:  · Redness or swelling of a painful joint  · Discharge or pus from a painful joint  · Fever of 100.4ºF (38ºC) or higher, or as directed by your healthcare provider  · Worsening joint pain  · Decreased ability to move the joint or bear weight on the joint  © 6603-8516 [employer]. [address] [place] [address], [place] [address]. All rights reserved. This information is not intended as a substitute for professional medical care. Always follow your healthcare professional's instructions.         No. REE screening performed.  STOP BANG Legend: 0-2 = LOW Risk; 3-4 = INTERMEDIATE Risk; 5-8 = HIGH Risk

## 2022-01-06 NOTE — H&P PST ADULT - DOES THIS PATIENT HAVE A HISTORY OF OR HAS BEEN DX WITH HEART FAILURE?
Patient has red spot on right thigh for 1 week  Symptoms: itching a little    Pt has try Triamcinolone it didn't help  [facility]   Please call pt no

## 2022-01-06 NOTE — H&P PST ADULT - NSICDXPASTSURGICALHX_GEN_ALL_CORE_FT
PAST SURGICAL HISTORY:  H/O  section     History of dilatation and curettage     History of liver biopsy

## 2022-01-06 NOTE — H&P PST ADULT - DENTITION
Continuity of Care Document

 Created on: 2018



RON PETER

External Reference #: [id number]

: 2004

Sex: Female



Demographics







 Preferred Language  Unknown

 

 Marital Status  Unknown

 

 Spiritism Affiliation  Unknown

 

 Race  Unknown

 

 Ethnic Group  Unknown





Author







 Author  [facility]

 

 Address  Unknown

 

 Phone  Unavailable



              



Allergies

      



There is no data.                  



Medications

      



There is no data.                  



Problems

      





 Date Dx Coded            Attending            Type            Code            
Diagnosis            Diagnosed By        

 

 10/08/2008                                      V20.2            visit for: 
well child visit                     

 

 10/09/2008                                      313.89            CD REACT 
ATTACHMENT                     

 

 2008                                      311            MO DEPRESSIVE 
DISORDER NOS                     

 

 10/15/2009                                      300.00            AN ANXIETY 
UNSPEC                     

 

 10/20/
                 

 

 10/18/

 

 [id number]            2013 00:00:00                                      
Document Registration
no loose teeth

## 2022-01-07 ENCOUNTER — RESULT REVIEW (OUTPATIENT)
Age: 52
End: 2022-01-07

## 2022-01-07 ENCOUNTER — OUTPATIENT (OUTPATIENT)
Dept: OUTPATIENT SERVICES | Facility: HOSPITAL | Age: 52
LOS: 1 days | Discharge: HOME | End: 2022-01-07
Payer: MEDICAID

## 2022-01-07 DIAGNOSIS — Z98.890 OTHER SPECIFIED POSTPROCEDURAL STATES: Chronic | ICD-10-CM

## 2022-01-07 DIAGNOSIS — Z98.891 HISTORY OF UTERINE SCAR FROM PREVIOUS SURGERY: Chronic | ICD-10-CM

## 2022-01-07 DIAGNOSIS — D25.9 LEIOMYOMA OF UTERUS, UNSPECIFIED: ICD-10-CM

## 2022-01-07 DIAGNOSIS — N85.01 BENIGN ENDOMETRIAL HYPERPLASIA: ICD-10-CM

## 2022-01-07 LAB
A1C WITH ESTIMATED AVERAGE GLUCOSE RESULT: 6.2 % — HIGH (ref 4–5.6)
CULTURE RESULTS: SIGNIFICANT CHANGE UP
ESTIMATED AVERAGE GLUCOSE: 131 MG/DL — HIGH (ref 68–114)
SPECIMEN SOURCE: SIGNIFICANT CHANGE UP

## 2022-01-07 PROCEDURE — 76856 US EXAM PELVIC COMPLETE: CPT | Mod: 26

## 2022-01-07 PROCEDURE — 76830 TRANSVAGINAL US NON-OB: CPT | Mod: 26

## 2022-01-13 ENCOUNTER — NON-APPOINTMENT (OUTPATIENT)
Age: 52
End: 2022-01-13

## 2022-01-15 ENCOUNTER — LABORATORY RESULT (OUTPATIENT)
Age: 52
End: 2022-01-15

## 2022-01-18 ENCOUNTER — RESULT REVIEW (OUTPATIENT)
Age: 52
End: 2022-01-18

## 2022-01-18 ENCOUNTER — OUTPATIENT (OUTPATIENT)
Dept: OUTPATIENT SERVICES | Facility: HOSPITAL | Age: 52
LOS: 1 days | Discharge: HOME | End: 2022-01-18
Payer: MEDICAID

## 2022-01-18 VITALS
SYSTOLIC BLOOD PRESSURE: 139 MMHG | OXYGEN SATURATION: 97 % | DIASTOLIC BLOOD PRESSURE: 81 MMHG | HEART RATE: 105 BPM | RESPIRATION RATE: 20 BRPM

## 2022-01-18 VITALS
DIASTOLIC BLOOD PRESSURE: 60 MMHG | SYSTOLIC BLOOD PRESSURE: 119 MMHG | OXYGEN SATURATION: 99 % | RESPIRATION RATE: 18 BRPM | HEIGHT: 65 IN | WEIGHT: 263.89 LBS | TEMPERATURE: 98 F

## 2022-01-18 DIAGNOSIS — N80.0 ENDOMETRIOSIS OF UTERUS: ICD-10-CM

## 2022-01-18 DIAGNOSIS — E66.01 MORBID (SEVERE) OBESITY DUE TO EXCESS CALORIES: ICD-10-CM

## 2022-01-18 DIAGNOSIS — D25.9 LEIOMYOMA OF UTERUS, UNSPECIFIED: ICD-10-CM

## 2022-01-18 DIAGNOSIS — N73.6 FEMALE PELVIC PERITONEAL ADHESIONS (POSTINFECTIVE): ICD-10-CM

## 2022-01-18 DIAGNOSIS — I10 ESSENTIAL (PRIMARY) HYPERTENSION: ICD-10-CM

## 2022-01-18 DIAGNOSIS — N85.00 ENDOMETRIAL HYPERPLASIA, UNSPECIFIED: ICD-10-CM

## 2022-01-18 DIAGNOSIS — Z98.891 HISTORY OF UTERINE SCAR FROM PREVIOUS SURGERY: Chronic | ICD-10-CM

## 2022-01-18 DIAGNOSIS — N72 INFLAMMATORY DISEASE OF CERVIX UTERI: ICD-10-CM

## 2022-01-18 DIAGNOSIS — Z98.890 OTHER SPECIFIED POSTPROCEDURAL STATES: Chronic | ICD-10-CM

## 2022-01-18 DIAGNOSIS — F32.A DEPRESSION, UNSPECIFIED: ICD-10-CM

## 2022-01-18 DIAGNOSIS — F41.9 ANXIETY DISORDER, UNSPECIFIED: ICD-10-CM

## 2022-01-18 DIAGNOSIS — Z88.0 ALLERGY STATUS TO PENICILLIN: ICD-10-CM

## 2022-01-18 DIAGNOSIS — Z87.891 PERSONAL HISTORY OF NICOTINE DEPENDENCE: ICD-10-CM

## 2022-01-18 DIAGNOSIS — E78.5 HYPERLIPIDEMIA, UNSPECIFIED: ICD-10-CM

## 2022-01-18 PROCEDURE — 88307 TISSUE EXAM BY PATHOLOGIST: CPT | Mod: 26

## 2022-01-18 PROCEDURE — 58573 TLH W/T/O UTERUS OVER 250 G: CPT

## 2022-01-18 PROCEDURE — 52000 CYSTOURETHROSCOPY: CPT | Mod: 59

## 2022-01-18 RX ORDER — MEPERIDINE HYDROCHLORIDE 50 MG/ML
12.5 INJECTION INTRAMUSCULAR; INTRAVENOUS; SUBCUTANEOUS
Refills: 0 | Status: DISCONTINUED | OUTPATIENT
Start: 2022-01-18 | End: 2022-01-19

## 2022-01-18 RX ORDER — OXYCODONE HYDROCHLORIDE 5 MG/1
1 TABLET ORAL
Qty: 12 | Refills: 0
Start: 2022-01-18 | End: 2022-01-20

## 2022-01-18 RX ORDER — ACETAMINOPHEN 500 MG
2 TABLET ORAL
Qty: 40 | Refills: 0
Start: 2022-01-18 | End: 2022-01-22

## 2022-01-18 RX ORDER — ONDANSETRON 8 MG/1
4 TABLET, FILM COATED ORAL ONCE
Refills: 0 | Status: DISCONTINUED | OUTPATIENT
Start: 2022-01-18 | End: 2022-01-19

## 2022-01-18 RX ORDER — IBUPROFEN 200 MG
1 TABLET ORAL
Qty: 28 | Refills: 0
Start: 2022-01-18 | End: 2022-01-24

## 2022-01-18 RX ORDER — SODIUM CHLORIDE 9 MG/ML
1000 INJECTION, SOLUTION INTRAVENOUS
Refills: 0 | Status: DISCONTINUED | OUTPATIENT
Start: 2022-01-18 | End: 2022-01-19

## 2022-01-18 RX ORDER — HYDROMORPHONE HYDROCHLORIDE 2 MG/ML
1 INJECTION INTRAMUSCULAR; INTRAVENOUS; SUBCUTANEOUS
Refills: 0 | Status: DISCONTINUED | OUTPATIENT
Start: 2022-01-18 | End: 2022-01-19

## 2022-01-18 RX ORDER — SIMETHICONE 80 MG/1
1 TABLET, CHEWABLE ORAL
Qty: 28 | Refills: 0
Start: 2022-01-18 | End: 2022-01-24

## 2022-01-18 RX ORDER — SENNA PLUS 8.6 MG/1
1 TABLET ORAL
Qty: 28 | Refills: 0
Start: 2022-01-18 | End: 2022-01-31

## 2022-01-18 RX ORDER — KETOROLAC TROMETHAMINE 30 MG/ML
30 SYRINGE (ML) INJECTION ONCE
Refills: 0 | Status: DISCONTINUED | OUTPATIENT
Start: 2022-01-18 | End: 2022-01-18

## 2022-01-18 RX ORDER — DEXAMETHASONE 0.5 MG/5ML
8 ELIXIR ORAL ONCE
Refills: 0 | Status: DISCONTINUED | OUTPATIENT
Start: 2022-01-18 | End: 2022-01-19

## 2022-01-18 RX ORDER — HYDROMORPHONE HYDROCHLORIDE 2 MG/ML
0.5 INJECTION INTRAMUSCULAR; INTRAVENOUS; SUBCUTANEOUS
Refills: 0 | Status: DISCONTINUED | OUTPATIENT
Start: 2022-01-18 | End: 2022-01-19

## 2022-01-18 RX ADMIN — HYDROMORPHONE HYDROCHLORIDE 1 MILLIGRAM(S): 2 INJECTION INTRAMUSCULAR; INTRAVENOUS; SUBCUTANEOUS at 19:00

## 2022-01-18 RX ADMIN — Medication 30 MILLIGRAM(S): at 19:00

## 2022-01-18 RX ADMIN — HYDROMORPHONE HYDROCHLORIDE 1 MILLIGRAM(S): 2 INJECTION INTRAMUSCULAR; INTRAVENOUS; SUBCUTANEOUS at 20:22

## 2022-01-18 RX ADMIN — HYDROMORPHONE HYDROCHLORIDE 1 MILLIGRAM(S): 2 INJECTION INTRAMUSCULAR; INTRAVENOUS; SUBCUTANEOUS at 18:45

## 2022-01-18 RX ADMIN — Medication 30 MILLIGRAM(S): at 19:06

## 2022-01-18 RX ADMIN — HYDROMORPHONE HYDROCHLORIDE 1 MILLIGRAM(S): 2 INJECTION INTRAMUSCULAR; INTRAVENOUS; SUBCUTANEOUS at 19:10

## 2022-01-18 NOTE — BRIEF OPERATIVE NOTE - OPERATION/FINDINGS
enlarged multi-fibroid uterus, approximately 14wk size, some adhesions of anterior uterus to bladder  normal tubes and ovaries bilaterally  no cervical or vaginal lesions  no abdominal pathology    cystoscopy: no bladder injury, bilateral ureteral jets visualized

## 2022-01-18 NOTE — BRIEF OPERATIVE NOTE - NSICDXBRIEFPREOP_GEN_ALL_CORE_FT
PRE-OP DIAGNOSIS:  Endometrial hyperplasia 18-Jan-2022 18:18:17  Yancy Guerrero  Fibroid uterus 18-Jan-2022 18:18:24  Yancy Guerrero

## 2022-01-18 NOTE — ASU DISCHARGE PLAN (ADULT/PEDIATRIC) - ACTIVITY LEVEL
No excercise/No heavy lifting/Quiet play/Nothing per rectum/Nothing per vagina/No tub baths/No douching/No tampons/No intercourse

## 2022-01-18 NOTE — CHART NOTE - NSCHARTNOTEFT_GEN_A_CORE
PACU ANESTHESIA ADMISSION NOTE      Procedure: Robot-assisted laparoscopic total hysterectomy with bilateral salpingo-oophorectomy (BSO) and cystoscopy using da Christian Xi      Post op diagnosis:  Endometrial hyperplasia    Fibroid uterus        ____  Intubated  TV:______       Rate: ______      FiO2: ______    _x___  Patent Airway    _x___  Full return of protective reflexes    __  Full recovery from anesthesia / back to baseline status    Vitals:            T:     99.6           BP :    141/91            R:  22            Sat:  97%             P: 107      Mental Status:  _x___ Awake   _____ Alert   _____ Drowsy   _____ Sedated    Nausea/Vomiting:  _x___  NO       ______Yes,   See Post - Op Orders         Pain Scale (0-10):  __0___    Treatment: ___ None    __x__ See Post - Op/PCA Orders    Post - Operative Fluids:   __x__ Oral   ____ See Post - Op Orders    Plan: Discharge:   _x___Home       _____Floor     _____Critical Care    _____  Other:_________________    Comments:  No anesthesia issues or complications noted.  Discharge when criteria met.

## 2022-01-18 NOTE — BRIEF OPERATIVE NOTE - NSICDXBRIEFPROCEDURE_GEN_ALL_CORE_FT
PROCEDURES:  Robot-assisted laparoscopic total hysterectomy with bilateral salpingo-oophorectomy (BSO) and cystoscopy using da Christian Xi 18-Jan-2022 18:18:03  Yancy Guerrero

## 2022-01-18 NOTE — BRIEF OPERATIVE NOTE - NSICDXBRIEFPOSTOP_GEN_ALL_CORE_FT
POST-OP DIAGNOSIS:  Endometrial hyperplasia 18-Jan-2022 18:18:59  Yancy Guerrero  Fibroid uterus 18-Jan-2022 18:19:05  Yancy Guerrero

## 2022-01-18 NOTE — ASU DISCHARGE PLAN (ADULT/PEDIATRIC) - NS MD DC FALL RISK RISK
For information on Fall & Injury Prevention, visit: [email or web address] OR  [email or web address] OR  https://www.cdc.gov/steadi/patient.html

## 2022-01-18 NOTE — ASU DISCHARGE PLAN (ADULT/PEDIATRIC) - CARE PROVIDER_API CALL
Mazin Murary)  Obstetrics and Gynecology  [address] [place] [address]  Phone: [phone]  Fax: [phone]  Established Patient  Follow Up Time: Routine

## 2022-01-19 PROBLEM — Z87.42 PERSONAL HISTORY OF OTHER DISEASES OF THE FEMALE GENITAL TRACT: Chronic | Status: ACTIVE | Noted: 2022-01-06

## 2022-01-19 PROBLEM — Z86.69 PERSONAL HISTORY OF OTHER DISEASES OF THE NERVOUS SYSTEM AND SENSE ORGANS: Chronic | Status: ACTIVE | Noted: 2022-01-06

## 2022-01-27 ENCOUNTER — APPOINTMENT (OUTPATIENT)
Dept: OBGYN | Facility: CLINIC | Age: 52
End: 2022-01-27
Payer: MEDICAID

## 2022-01-27 ENCOUNTER — TRANSCRIPTION ENCOUNTER (OUTPATIENT)
Age: 52
End: 2022-01-27

## 2022-01-27 DIAGNOSIS — N76.0 ACUTE VAGINITIS: ICD-10-CM

## 2022-01-27 DIAGNOSIS — N85.01 BENIGN ENDOMETRIAL HYPERPLASIA: ICD-10-CM

## 2022-01-27 PROCEDURE — 99024 POSTOP FOLLOW-UP VISIT: CPT

## 2022-01-27 RX ORDER — CIPROFLOXACIN HYDROCHLORIDE 500 MG/1
500 TABLET, FILM COATED ORAL
Qty: 28 | Refills: 0 | Status: ACTIVE | COMMUNITY
Start: 2022-01-27 | End: 1900-01-01

## 2022-01-27 RX ORDER — METRONIDAZOLE 500 MG/1
500 TABLET ORAL TWICE DAILY
Qty: 28 | Refills: 0 | Status: ACTIVE | COMMUNITY
Start: 2022-01-27 | End: 1900-01-01

## 2022-01-27 NOTE — HISTORY OF PRESENT ILLNESS
[Pain is well-controlled] : pain is well-controlled [Fever] : no fever [Chills] : no chills [Nausea] : no nausea [Vomiting] : no vomiting [Diarrhea] : no diarrhea [Vaginal Bleeding] : no vaginal bleeding [Pelvic Pressure] : no pelvic pressure [Dysuria] : no dysuria [Vaginal Discharge] : vaginal discharge [Constipation] : no constipation [Clean/Dry/Intact] : clean, dry and intact [Erythema] : not erythematous [Swelling] : not swollen [Dehiscence] : not dehisced [Healed] : healed [Discharge] : absent of discharge [None] : no vaginal bleeding [Pathology reviewed] : pathology reviewed [de-identified] : Patient is 2 weeks postop - RATLH/BSO.\par C/O foul smelling discharge from vagina.\par No fever.\par No bleeding.\par  [de-identified] : The vaginal cuff is a bit indurated. No collection. THere is minimal amount of foul smelling white/yellow discharge.

## 2022-01-27 NOTE — PLAN
[FreeTextEntry1] : Mild vaginal cuff cellulitis.\par Start Antibiotics (Pcn allergic)\par Warning signs d/w patient.\par f/u 4 weeks for repeat vaginal exam.\par I will call the lab to find out about path report.\par

## 2022-02-01 LAB — SURGICAL PATHOLOGY STUDY: SIGNIFICANT CHANGE UP

## 2022-03-10 ENCOUNTER — APPOINTMENT (OUTPATIENT)
Dept: OBGYN | Facility: CLINIC | Age: 52
End: 2022-03-10
Payer: MEDICAID

## 2022-03-10 DIAGNOSIS — Z09 ENCOUNTER FOR FOLLOW-UP EXAMINATION AFTER COMPLETED TREATMENT FOR CONDITIONS OTHER THAN MALIGNANT NEOPLASM: ICD-10-CM

## 2022-03-10 DIAGNOSIS — Z87.42 PERSONAL HISTORY OF OTHER DISEASES OF THE FEMALE GENITAL TRACT: ICD-10-CM

## 2022-03-10 DIAGNOSIS — Z86.018 PERSONAL HISTORY OF OTHER BENIGN NEOPLASM: ICD-10-CM

## 2022-03-10 PROCEDURE — 99024 POSTOP FOLLOW-UP VISIT: CPT

## 2022-03-10 NOTE — HISTORY OF PRESENT ILLNESS
[Pain is well-controlled] : pain is well-controlled [Fever] : no fever [Chills] : no chills [Nausea] : no nausea [Vomiting] : no vomiting [Clean/Dry/Intact] : clean, dry and intact [Erythema] : not erythematous [None] : no vaginal bleeding [Normal] : normal [Pathology reviewed] : pathology reviewed [de-identified] : S/P RATLHBSO 6 weeks ago.\par Doing well.\par Had mild cuff cellulitis that was sucessfully treated \par

## 2022-04-04 NOTE — ASU PATIENT PROFILE, ADULT - PAIN, FACTORS THAT RELIEVE, PROFILE
Last office visit 10/29/21    Upcoming appointment with provider NONE    PDMP    Tramadol HCl  50MG / Tablet  Rx# [id number] Opioid Qty: 60  Days: 30  Refills: 1 Prescribed: 2021  Dispensed: 2021  Sold: 2021 SHIVAM VARELA  [address] [place] [address] [place]  [address] [address] MICHELLE GARCIA  : 1936       medications/repositioning

## 2022-09-15 ENCOUNTER — APPOINTMENT (OUTPATIENT)
Dept: OBGYN | Facility: CLINIC | Age: 52
End: 2022-09-15

## 2022-12-11 NOTE — H&P PST ADULT - NSSUBSTANCEUSE_GEN_ALL_CORE_SD
Discharge and RX instructions given, pt provided verbal understanding. Discharge assessment complete vital signs within normal limits. Pt agree they are ready for discharge.  All questions were answered.  Pt agrees to follow instructions.    
Pt to CT  
never used

## 2022-12-20 NOTE — ED CDU PROVIDER INITIAL DAY NOTE - FAMILY HISTORY
End of day labs resulted, potassium and phosphorus replacements given per standing orders   Mother  Still living? No  Family history of acute myocardial infarction, Age at diagnosis: 41-50     Sibling  Still living? Unknown  Family history of CHF (congestive heart failure), Age at diagnosis: Age Unknown     Aunt  Still living? Unknown  Family history of early CAD, Age at diagnosis: 51-60

## 2023-08-05 NOTE — ASU DISCHARGE PLAN (ADULT/PEDIATRIC) - ASU DISCHARGE DATE/TIME
Mobility  Ambulation  WB Status:  (-)  Ambulation  Surface: Level tile  Device: Parallel Bars  Other Apparatus: Wheelchair follow  Assistance: Moderate assistance;2 Person assistance;Maximum assistance (Therapist in front, watching L LE placement, 2nd assist on left side of pt. Mod ax 2 in A:M, max A x 2 P:M.)  Quality of Gait: Small steps, step to pattern, L knee hyperextends. Noticed Left footslightly inverts. Pt has sjoes on during standing activity. Distance: 10 ft x2 (A:M), 10 ft (P:M)  Comments: Pt needs cues to stand up stright and look up too. Fatiques easily. More Ambulation?: No  Stairs/Curb  Stairs?: No    PT Exercises  Exercise Treatment: BLE ankle pumps. [id number]  Resistive Exercises: Seated B LE ex' s x15 AROM and with Lime theraband. Pressure Relief Exercises: sit to stand x 5 reps in // bars-max a x 1  Functional Mobility Circuit Training: Toilet transfers with natan stedy at mod a x 2  Static Standing Balance Exercises: Standing tolerance ~1 min. x 5 times    ASSESSMENT  Vitals  BP Location: Right upper arm  O2 Device: None (Room air)    Activity Tolerance  Activity Tolerance: Patient limited by endurance; Patient limited by fatigue;Treatment limited secondary to decreased cognition  Activity Tolerance Comments: L side weakness/deficits from hx of TIA in the past-Left LE slides out during sit<>stand, needs to be blocked during sit<>stnad. Assessment  Assessment: Pt dependent x2 person to perform safe transfers using natan stedy. Pt donny to take 10 small stepsx 3 , in // bars with 2 person assist.Pt most limited by L side deficits, L LE slides out during sit<>stand and therapist has to block her L LE to prevent sliding. Pt with history of TIA and has left side weakness from TIA per pt/spouse. Pt will benefit from continued PT to address deficits and maximize safety and independence with mobility.  She is currently a high fall risk and would be unsafe to return to prior living Andrei Meyers, PT, 08/05/23 at 5:42 PM 04-Nov-2019 12:36

## 2023-11-02 ENCOUNTER — APPOINTMENT (OUTPATIENT)
Dept: OBGYN | Facility: CLINIC | Age: 53
End: 2023-11-02

## 2024-04-09 NOTE — ASU PREOP CHECKLIST - VIA
Psychotic-paraoid and disorganized. See HPI ambulate Admit under 9.39 Sister has Bipolar and Brother has PTSD

## 2024-08-23 ENCOUNTER — EMERGENCY (EMERGENCY)
Facility: HOSPITAL | Age: 54
LOS: 0 days | Discharge: HOME | End: 2024-08-23
Attending: EMERGENCY MEDICINE
Payer: MEDICAID

## 2024-08-23 VITALS
SYSTOLIC BLOOD PRESSURE: 144 MMHG | DIASTOLIC BLOOD PRESSURE: 77 MMHG | HEART RATE: 101 BPM | OXYGEN SATURATION: 98 % | RESPIRATION RATE: 17 BRPM | TEMPERATURE: 98 F | HEIGHT: 65 IN | WEIGHT: 240.08 LBS

## 2024-08-23 VITALS
SYSTOLIC BLOOD PRESSURE: 116 MMHG | HEART RATE: 90 BPM | DIASTOLIC BLOOD PRESSURE: 79 MMHG | RESPIRATION RATE: 18 BRPM | TEMPERATURE: 98 F | OXYGEN SATURATION: 95 %

## 2024-08-23 DIAGNOSIS — Z98.890 OTHER SPECIFIED POSTPROCEDURAL STATES: Chronic | ICD-10-CM

## 2024-08-23 DIAGNOSIS — I10 ESSENTIAL (PRIMARY) HYPERTENSION: ICD-10-CM

## 2024-08-23 DIAGNOSIS — M19.90 UNSPECIFIED OSTEOARTHRITIS, UNSPECIFIED SITE: ICD-10-CM

## 2024-08-23 DIAGNOSIS — R10.11 RIGHT UPPER QUADRANT PAIN: ICD-10-CM

## 2024-08-23 DIAGNOSIS — N39.0 URINARY TRACT INFECTION, SITE NOT SPECIFIED: ICD-10-CM

## 2024-08-23 DIAGNOSIS — M79.7 FIBROMYALGIA: ICD-10-CM

## 2024-08-23 DIAGNOSIS — Z98.891 HISTORY OF UTERINE SCAR FROM PREVIOUS SURGERY: Chronic | ICD-10-CM

## 2024-08-23 DIAGNOSIS — R11.0 NAUSEA: ICD-10-CM

## 2024-08-23 DIAGNOSIS — Z88.0 ALLERGY STATUS TO PENICILLIN: ICD-10-CM

## 2024-08-23 DIAGNOSIS — R10.31 RIGHT LOWER QUADRANT PAIN: ICD-10-CM

## 2024-08-23 LAB
ALBUMIN SERPL ELPH-MCNC: 4.4 G/DL — SIGNIFICANT CHANGE UP (ref 3.5–5.2)
ALP SERPL-CCNC: 109 U/L — SIGNIFICANT CHANGE UP (ref 30–115)
ALT FLD-CCNC: 31 U/L — SIGNIFICANT CHANGE UP (ref 0–41)
ANION GAP SERPL CALC-SCNC: 13 MMOL/L — SIGNIFICANT CHANGE UP (ref 7–14)
APPEARANCE UR: CLEAR — SIGNIFICANT CHANGE UP
AST SERPL-CCNC: 31 U/L — SIGNIFICANT CHANGE UP (ref 0–41)
BACTERIA # UR AUTO: NEGATIVE /HPF — SIGNIFICANT CHANGE UP
BASOPHILS # BLD AUTO: 0.03 K/UL — SIGNIFICANT CHANGE UP (ref 0–0.2)
BASOPHILS NFR BLD AUTO: 0.3 % — SIGNIFICANT CHANGE UP (ref 0–1)
BILIRUB SERPL-MCNC: 0.9 MG/DL — SIGNIFICANT CHANGE UP (ref 0.2–1.2)
BILIRUB UR-MCNC: NEGATIVE — SIGNIFICANT CHANGE UP
BUN SERPL-MCNC: 13 MG/DL — SIGNIFICANT CHANGE UP (ref 10–20)
CALCIUM SERPL-MCNC: 9.8 MG/DL — SIGNIFICANT CHANGE UP (ref 8.4–10.5)
CAST: 2 /LPF — SIGNIFICANT CHANGE UP (ref 0–4)
CHLORIDE SERPL-SCNC: 102 MMOL/L — SIGNIFICANT CHANGE UP (ref 98–110)
CO2 SERPL-SCNC: 24 MMOL/L — SIGNIFICANT CHANGE UP (ref 17–32)
COLOR SPEC: YELLOW — SIGNIFICANT CHANGE UP
CREAT SERPL-MCNC: 0.8 MG/DL — SIGNIFICANT CHANGE UP (ref 0.7–1.5)
DIFF PNL FLD: ABNORMAL
EGFR: 89 ML/MIN/1.73M2 — SIGNIFICANT CHANGE UP
EOSINOPHIL # BLD AUTO: 0.12 K/UL — SIGNIFICANT CHANGE UP (ref 0–0.7)
EOSINOPHIL NFR BLD AUTO: 1.2 % — SIGNIFICANT CHANGE UP (ref 0–8)
GLUCOSE SERPL-MCNC: 109 MG/DL — HIGH (ref 70–99)
GLUCOSE UR QL: NEGATIVE MG/DL — SIGNIFICANT CHANGE UP
HCG SERPL QL: NEGATIVE — SIGNIFICANT CHANGE UP
HCT VFR BLD CALC: 44 % — SIGNIFICANT CHANGE UP (ref 37–47)
HGB BLD-MCNC: 14.8 G/DL — SIGNIFICANT CHANGE UP (ref 12–16)
IMM GRANULOCYTES NFR BLD AUTO: 0.3 % — SIGNIFICANT CHANGE UP (ref 0.1–0.3)
KETONES UR-MCNC: ABNORMAL MG/DL
LACTATE SERPL-SCNC: 1.9 MMOL/L — SIGNIFICANT CHANGE UP (ref 0.7–2)
LEUKOCYTE ESTERASE UR-ACNC: ABNORMAL
LYMPHOCYTES # BLD AUTO: 2.64 K/UL — SIGNIFICANT CHANGE UP (ref 1.2–3.4)
LYMPHOCYTES # BLD AUTO: 27.2 % — SIGNIFICANT CHANGE UP (ref 20.5–51.1)
MCHC RBC-ENTMCNC: 30.3 PG — SIGNIFICANT CHANGE UP (ref 27–31)
MCHC RBC-ENTMCNC: 33.6 G/DL — SIGNIFICANT CHANGE UP (ref 32–37)
MCV RBC AUTO: 90 FL — SIGNIFICANT CHANGE UP (ref 81–99)
MONOCYTES # BLD AUTO: 0.58 K/UL — SIGNIFICANT CHANGE UP (ref 0.1–0.6)
MONOCYTES NFR BLD AUTO: 6 % — SIGNIFICANT CHANGE UP (ref 1.7–9.3)
NEUTROPHILS # BLD AUTO: 6.29 K/UL — SIGNIFICANT CHANGE UP (ref 1.4–6.5)
NEUTROPHILS NFR BLD AUTO: 65 % — SIGNIFICANT CHANGE UP (ref 42.2–75.2)
NITRITE UR-MCNC: NEGATIVE — SIGNIFICANT CHANGE UP
NRBC # BLD: 0 /100 WBCS — SIGNIFICANT CHANGE UP (ref 0–0)
PH UR: 5.5 — SIGNIFICANT CHANGE UP (ref 5–8)
PLATELET # BLD AUTO: 344 K/UL — SIGNIFICANT CHANGE UP (ref 130–400)
PMV BLD: 10.3 FL — SIGNIFICANT CHANGE UP (ref 7.4–10.4)
POTASSIUM SERPL-MCNC: 4.8 MMOL/L — SIGNIFICANT CHANGE UP (ref 3.5–5)
POTASSIUM SERPL-SCNC: 4.8 MMOL/L — SIGNIFICANT CHANGE UP (ref 3.5–5)
PROT SERPL-MCNC: 7.6 G/DL — SIGNIFICANT CHANGE UP (ref 6–8)
PROT UR-MCNC: 30 MG/DL
RBC # BLD: 4.89 M/UL — SIGNIFICANT CHANGE UP (ref 4.2–5.4)
RBC # FLD: 13.1 % — SIGNIFICANT CHANGE UP (ref 11.5–14.5)
RBC CASTS # UR COMP ASSIST: 3 /HPF — SIGNIFICANT CHANGE UP (ref 0–4)
SODIUM SERPL-SCNC: 139 MMOL/L — SIGNIFICANT CHANGE UP (ref 135–146)
SP GR SPEC: 1.02 — SIGNIFICANT CHANGE UP (ref 1–1.03)
SQUAMOUS # UR AUTO: 4 /HPF — SIGNIFICANT CHANGE UP (ref 0–5)
UROBILINOGEN FLD QL: 1 MG/DL — SIGNIFICANT CHANGE UP (ref 0.2–1)
WBC # BLD: 9.69 K/UL — SIGNIFICANT CHANGE UP (ref 4.8–10.8)
WBC # FLD AUTO: 9.69 K/UL — SIGNIFICANT CHANGE UP (ref 4.8–10.8)
WBC UR QL: 8 /HPF — HIGH (ref 0–5)

## 2024-08-23 PROCEDURE — 82962 GLUCOSE BLOOD TEST: CPT

## 2024-08-23 PROCEDURE — 84703 CHORIONIC GONADOTROPIN ASSAY: CPT

## 2024-08-23 PROCEDURE — 96375 TX/PRO/DX INJ NEW DRUG ADDON: CPT

## 2024-08-23 PROCEDURE — 96374 THER/PROPH/DIAG INJ IV PUSH: CPT | Mod: XU

## 2024-08-23 PROCEDURE — 85025 COMPLETE CBC W/AUTO DIFF WBC: CPT

## 2024-08-23 PROCEDURE — 36415 COLL VENOUS BLD VENIPUNCTURE: CPT

## 2024-08-23 PROCEDURE — 74177 CT ABD & PELVIS W/CONTRAST: CPT | Mod: 26,MC

## 2024-08-23 PROCEDURE — 74177 CT ABD & PELVIS W/CONTRAST: CPT | Mod: MC

## 2024-08-23 PROCEDURE — 99285 EMERGENCY DEPT VISIT HI MDM: CPT

## 2024-08-23 PROCEDURE — 80053 COMPREHEN METABOLIC PANEL: CPT

## 2024-08-23 PROCEDURE — 99284 EMERGENCY DEPT VISIT MOD MDM: CPT | Mod: 25

## 2024-08-23 PROCEDURE — 83605 ASSAY OF LACTIC ACID: CPT

## 2024-08-23 PROCEDURE — 87086 URINE CULTURE/COLONY COUNT: CPT

## 2024-08-23 PROCEDURE — 81001 URINALYSIS AUTO W/SCOPE: CPT

## 2024-08-23 RX ORDER — SULFAMETHOXAZOLE AND TRIMETHOPRIM 400; 80 MG/1; MG/1
1 TABLET ORAL
Qty: 10 | Refills: 0
Start: 2024-08-23 | End: 2024-08-27

## 2024-08-23 RX ORDER — CIPROFLOXACIN 500 MG/1
1 TABLET, FILM COATED ORAL
Qty: 3 | Refills: 0
Start: 2024-08-23 | End: 2024-08-25

## 2024-08-23 RX ORDER — KETOROLAC TROMETHAMINE 10 MG
15 TABLET ORAL ONCE
Refills: 0 | Status: DISCONTINUED | OUTPATIENT
Start: 2024-08-23 | End: 2024-08-23

## 2024-08-23 RX ORDER — DEXTROSE MONOHYDRATE, SODIUM CHLORIDE, SODIUM LACTATE, CALCIUM CHLORIDE, MAGNESIUM CHLORIDE 1.5; 538; 448; 18.4; 5.08 G/100ML; MG/100ML; MG/100ML; MG/100ML; MG/100ML
1000 SOLUTION INTRAPERITONEAL ONCE
Refills: 0 | Status: COMPLETED | OUTPATIENT
Start: 2024-08-23 | End: 2024-08-23

## 2024-08-23 RX ORDER — CIPROFLOXACIN 500 MG/1
400 TABLET, FILM COATED ORAL ONCE
Refills: 0 | Status: COMPLETED | OUTPATIENT
Start: 2024-08-23 | End: 2024-08-23

## 2024-08-23 RX ADMIN — CIPROFLOXACIN 200 MILLIGRAM(S): 500 TABLET, FILM COATED ORAL at 16:17

## 2024-08-23 RX ADMIN — Medication 15 MILLIGRAM(S): at 14:35

## 2024-08-23 RX ADMIN — DEXTROSE MONOHYDRATE, SODIUM CHLORIDE, SODIUM LACTATE, CALCIUM CHLORIDE, MAGNESIUM CHLORIDE 1000 MILLILITER(S): 1.5; 538; 448; 18.4; 5.08 SOLUTION INTRAPERITONEAL at 14:35

## 2024-08-23 NOTE — ED PROVIDER NOTE - CLINICAL SUMMARY MEDICAL DECISION MAKING FREE TEXT BOX
53-year-old female with a past medical history as above presents with right flank pain for the last 3 days.  Associated with nausea and chills.  Denies any vomiting or fever.  On exam nontoxic, vital signs noted, mild right mid abdominal tenderness, no rebound or guarding.  Mild CVA tenderness on the right.  Labs overall reassuring.  UA consistent with UTI.  CT abdomen pelvis with no acute abnormalities.  Will treat for urinary tract infection.  In my opinion, based on current evaluation and results, an acute medical or surgical emergency does not appear to be occurring at this time and I feel that the pt is stable for further outpatient work up and/or treatment.  Return precautions given.

## 2024-08-23 NOTE — ED PROVIDER NOTE - CARE PROVIDER_API CALL
Jd Goodrich  Internal Medicine  [address] [place] [id number]  Phone: [phone]  Fax: [phone]  Follow Up Time: 1-3 Days

## 2024-08-23 NOTE — ED PROVIDER NOTE - NSFOLLOWUPINSTRUCTIONS_ED_ALL_ED_FT
Please follow up with your primary care doctor in 1-3 days  Please be aware of any new or worsening signs or symptoms that should prompt your return to the ER.      Urinary Tract Infection    A urinary tract infection (UTI) is an infection of any part of the urinary tract, which includes the kidneys, ureters, bladder, and urethra. Risk factors include ignoring your need to urinate, wiping back to front if female, being an uncircumcised male, and having diabetes or a weak immune system. Symptoms include frequent urination, pain or burning with urination, foul smelling urine, cloudy urine, pain in the lower abdomen, blood in the urine, and fever. If you were prescribed an antibiotic medicine, take it as told by your health care provider. Do not stop taking the antibiotic even if you start to feel better.    SEEK IMMEDIATE MEDICAL CARE IF YOU HAVE THE FOLLOWING SYMPTOMS: severe back or abdominal pain, inability to keep fluids or medicine down, dizziness/lightheadedness, or a change in mental status.

## 2024-08-23 NOTE — ED PROVIDER NOTE - OBJECTIVE STATEMENT
53 year old female, past medical history fibromyalgia, htn, djd, who presents with right flank pain x3 days. patient with constant, non-radiating right flank pain that began x3 days ago, worsening, with associated nausea and chills. denies chest pain, shortness of breath, urinary symptoms, bowel changes. no hx abd surgeries. no falls.

## 2024-08-23 NOTE — ED PROVIDER NOTE - PHYSICAL EXAMINATION
CONSTITUTIONAL: non-toxic appearing female. nad   SKIN: skin exam is warm and dry  HEAD: Normocephalic; atraumatic.   ENT: MMM.  CARD: S1, S2 normal, no murmur  RESP: No wheezes, rales or rhonchi. Good air movement bilaterally  ABD: soft; non-distended; +R sided abd tenderness, no rebound/guarding. no CVAT   EXT: Normal ROM.   NEURO: awake, alert, following commands, oriented, grossly unremarkable. No Focal deficits. GCS 15.   PSYCH: Cooperative, appropriate.

## 2024-08-23 NOTE — ED PROVIDER NOTE - ATTENDING APP SHARED VISIT CONTRIBUTION OF CARE
see MDM Rifampin Counseling: I discussed with the patient the risks of rifampin including but not limited to liver damage, kidney damage, red-orange body fluids, nausea/vomiting and severe allergy.

## 2024-08-23 NOTE — ED PROVIDER NOTE - PROGRESS NOTE DETAILS
patient with significant improvement of pain after meds. results thoroughly discussed with patient, educated on signs and symptoms to be aware of that should prompt return to the er. patient verbalizes understanding and is agreeable with plan.

## 2024-08-23 NOTE — ED PROVIDER NOTE - PATIENT PORTAL LINK FT
You can access the FollowMyHealth Patient Portal offered by [facility] by registering at the following website: [email or web address]. By joining [employer]’s FollowMyHealth portal, you will also be able to view your health information using other applications (apps) compatible with our system.

## 2024-08-24 LAB
CULTURE RESULTS: SIGNIFICANT CHANGE UP
SPECIMEN SOURCE: SIGNIFICANT CHANGE UP

## 2024-10-01 NOTE — ASU PATIENT PROFILE, ADULT - FALL HARM RISK TYPE OF ASSESSMENT
----- Message from La Jean Baptiste MD sent at 9/29/2024 10:00 PM CDT -----  Urine culture negative.  INR normal.  PTT almost back to normal.  Please get update on patient this week.  MAT  
Left message on Indu's phone to give us a update on pt.   
Ok will fill out after his appt tomorrow.   But we only have the form they brought in last week, nothing more.  If there is a different form they have, make sure Bryan brings it along.  MAT  
Ok. MAT  
Spoke with forms completion and I informed them to have the disability forms done stat. Pt. Is being seen tomorrow.   
Admission

## 2025-03-19 ENCOUNTER — EMERGENCY (EMERGENCY)
Facility: HOSPITAL | Age: 55
LOS: 0 days | Discharge: HOME | End: 2025-03-19
Attending: EMERGENCY MEDICINE
Payer: MEDICAID

## 2025-03-19 VITALS
DIASTOLIC BLOOD PRESSURE: 76 MMHG | OXYGEN SATURATION: 96 % | RESPIRATION RATE: 18 BRPM | TEMPERATURE: 99 F | HEART RATE: 108 BPM | WEIGHT: 259.93 LBS | SYSTOLIC BLOOD PRESSURE: 143 MMHG

## 2025-03-19 DIAGNOSIS — Z98.891 HISTORY OF UTERINE SCAR FROM PREVIOUS SURGERY: Chronic | ICD-10-CM

## 2025-03-19 DIAGNOSIS — M54.42 LUMBAGO WITH SCIATICA, LEFT SIDE: ICD-10-CM

## 2025-03-19 DIAGNOSIS — M79.7 FIBROMYALGIA: ICD-10-CM

## 2025-03-19 DIAGNOSIS — M54.50 LOW BACK PAIN, UNSPECIFIED: ICD-10-CM

## 2025-03-19 DIAGNOSIS — R42 DIZZINESS AND GIDDINESS: ICD-10-CM

## 2025-03-19 DIAGNOSIS — I10 ESSENTIAL (PRIMARY) HYPERTENSION: ICD-10-CM

## 2025-03-19 DIAGNOSIS — Z98.890 OTHER SPECIFIED POSTPROCEDURAL STATES: Chronic | ICD-10-CM

## 2025-03-19 DIAGNOSIS — Z87.39 PERSONAL HISTORY OF OTHER DISEASES OF THE MUSCULOSKELETAL SYSTEM AND CONNECTIVE TISSUE: ICD-10-CM

## 2025-03-19 DIAGNOSIS — Z88.0 ALLERGY STATUS TO PENICILLIN: ICD-10-CM

## 2025-03-19 LAB
ALBUMIN SERPL ELPH-MCNC: 4.4 G/DL — SIGNIFICANT CHANGE UP (ref 3.5–5.2)
ALP SERPL-CCNC: 115 U/L — SIGNIFICANT CHANGE UP (ref 30–115)
ALT FLD-CCNC: 24 U/L — SIGNIFICANT CHANGE UP (ref 0–41)
ANION GAP SERPL CALC-SCNC: 18 MMOL/L — HIGH (ref 7–14)
AST SERPL-CCNC: 40 U/L — SIGNIFICANT CHANGE UP (ref 0–41)
BASOPHILS # BLD AUTO: 0.03 K/UL — SIGNIFICANT CHANGE UP (ref 0–0.2)
BASOPHILS NFR BLD AUTO: 0.3 % — SIGNIFICANT CHANGE UP (ref 0–1)
BILIRUB SERPL-MCNC: 0.8 MG/DL — SIGNIFICANT CHANGE UP (ref 0.2–1.2)
BUN SERPL-MCNC: 19 MG/DL — SIGNIFICANT CHANGE UP (ref 10–20)
CALCIUM SERPL-MCNC: 10 MG/DL — SIGNIFICANT CHANGE UP (ref 8.4–10.5)
CHLORIDE SERPL-SCNC: 100 MMOL/L — SIGNIFICANT CHANGE UP (ref 98–110)
CO2 SERPL-SCNC: 21 MMOL/L — SIGNIFICANT CHANGE UP (ref 17–32)
CREAT SERPL-MCNC: 0.8 MG/DL — SIGNIFICANT CHANGE UP (ref 0.7–1.5)
EGFR: 88 ML/MIN/1.73M2 — SIGNIFICANT CHANGE UP
EGFR: 88 ML/MIN/1.73M2 — SIGNIFICANT CHANGE UP
EOSINOPHIL # BLD AUTO: 0.14 K/UL — SIGNIFICANT CHANGE UP (ref 0–0.7)
EOSINOPHIL NFR BLD AUTO: 1.4 % — SIGNIFICANT CHANGE UP (ref 0–8)
GLUCOSE SERPL-MCNC: 139 MG/DL — HIGH (ref 70–99)
HCT VFR BLD CALC: 44.4 % — SIGNIFICANT CHANGE UP (ref 37–47)
HGB BLD-MCNC: 14.9 G/DL — SIGNIFICANT CHANGE UP (ref 12–16)
IMM GRANULOCYTES NFR BLD AUTO: 0.3 % — SIGNIFICANT CHANGE UP (ref 0.1–0.3)
LYMPHOCYTES # BLD AUTO: 2.43 K/UL — SIGNIFICANT CHANGE UP (ref 1.2–3.4)
LYMPHOCYTES # BLD AUTO: 23.5 % — SIGNIFICANT CHANGE UP (ref 20.5–51.1)
MCHC RBC-ENTMCNC: 29.3 PG — SIGNIFICANT CHANGE UP (ref 27–31)
MCHC RBC-ENTMCNC: 33.6 G/DL — SIGNIFICANT CHANGE UP (ref 32–37)
MCV RBC AUTO: 87.2 FL — SIGNIFICANT CHANGE UP (ref 81–99)
MONOCYTES # BLD AUTO: 0.51 K/UL — SIGNIFICANT CHANGE UP (ref 0.1–0.6)
MONOCYTES NFR BLD AUTO: 4.9 % — SIGNIFICANT CHANGE UP (ref 1.7–9.3)
NEUTROPHILS # BLD AUTO: 7.19 K/UL — HIGH (ref 1.4–6.5)
NEUTROPHILS NFR BLD AUTO: 69.6 % — SIGNIFICANT CHANGE UP (ref 42.2–75.2)
NRBC BLD AUTO-RTO: 0 /100 WBCS — SIGNIFICANT CHANGE UP (ref 0–0)
PLATELET # BLD AUTO: 296 K/UL — SIGNIFICANT CHANGE UP (ref 130–400)
PMV BLD: 10.2 FL — SIGNIFICANT CHANGE UP (ref 7.4–10.4)
POTASSIUM SERPL-MCNC: 5.6 MMOL/L — HIGH (ref 3.5–5)
POTASSIUM SERPL-SCNC: 5.6 MMOL/L — HIGH (ref 3.5–5)
PROT SERPL-MCNC: 8.2 G/DL — HIGH (ref 6–8)
RBC # BLD: 5.09 M/UL — SIGNIFICANT CHANGE UP (ref 4.2–5.4)
RBC # FLD: 13.4 % — SIGNIFICANT CHANGE UP (ref 11.5–14.5)
SODIUM SERPL-SCNC: 139 MMOL/L — SIGNIFICANT CHANGE UP (ref 135–146)
WBC # BLD: 10.33 K/UL — SIGNIFICANT CHANGE UP (ref 4.8–10.8)
WBC # FLD AUTO: 10.33 K/UL — SIGNIFICANT CHANGE UP (ref 4.8–10.8)

## 2025-03-19 PROCEDURE — 96374 THER/PROPH/DIAG INJ IV PUSH: CPT

## 2025-03-19 PROCEDURE — 36415 COLL VENOUS BLD VENIPUNCTURE: CPT

## 2025-03-19 PROCEDURE — 99284 EMERGENCY DEPT VISIT MOD MDM: CPT | Mod: 25

## 2025-03-19 PROCEDURE — 80053 COMPREHEN METABOLIC PANEL: CPT

## 2025-03-19 PROCEDURE — 96375 TX/PRO/DX INJ NEW DRUG ADDON: CPT

## 2025-03-19 PROCEDURE — 99284 EMERGENCY DEPT VISIT MOD MDM: CPT

## 2025-03-19 PROCEDURE — 85025 COMPLETE CBC W/AUTO DIFF WBC: CPT

## 2025-03-19 PROCEDURE — 82962 GLUCOSE BLOOD TEST: CPT

## 2025-03-19 RX ORDER — IBUPROFEN 200 MG
1 TABLET ORAL
Qty: 15 | Refills: 0
Start: 2025-03-19 | End: 2025-03-23

## 2025-03-19 RX ORDER — TIZANIDINE 4 MG/1
4 TABLET ORAL ONCE
Refills: 0 | Status: COMPLETED | OUTPATIENT
Start: 2025-03-19 | End: 2025-03-19

## 2025-03-19 RX ORDER — TIZANIDINE 4 MG/1
1 TABLET ORAL
Qty: 20 | Refills: 0
Start: 2025-03-19 | End: 2025-03-23

## 2025-03-19 RX ORDER — MECLIZINE HCL 12.5 MG
25 TABLET ORAL ONCE
Refills: 0 | Status: COMPLETED | OUTPATIENT
Start: 2025-03-19 | End: 2025-03-19

## 2025-03-19 RX ORDER — LIDOCAINE HYDROCHLORIDE 20 MG/ML
1 JELLY TOPICAL ONCE
Refills: 0 | Status: COMPLETED | OUTPATIENT
Start: 2025-03-19 | End: 2025-03-19

## 2025-03-19 RX ORDER — KETOROLAC TROMETHAMINE 30 MG/ML
30 INJECTION, SOLUTION INTRAMUSCULAR; INTRAVENOUS ONCE
Refills: 0 | Status: DISCONTINUED | OUTPATIENT
Start: 2025-03-19 | End: 2025-03-19

## 2025-03-19 RX ORDER — ACETAMINOPHEN 500 MG/5ML
2 LIQUID (ML) ORAL
Qty: 30 | Refills: 0
Start: 2025-03-19

## 2025-03-19 RX ORDER — LIDOCAINE HYDROCHLORIDE 20 MG/ML
1 JELLY TOPICAL
Qty: 15 | Refills: 0
Start: 2025-03-19

## 2025-03-19 RX ADMIN — Medication 4 MILLIGRAM(S): at 12:02

## 2025-03-19 RX ADMIN — Medication 25 MILLIGRAM(S): at 11:47

## 2025-03-19 RX ADMIN — LIDOCAINE HYDROCHLORIDE 1 PATCH: 20 JELLY TOPICAL at 11:47

## 2025-03-19 RX ADMIN — TIZANIDINE 4 MILLIGRAM(S): 4 TABLET ORAL at 11:47

## 2025-03-19 RX ADMIN — KETOROLAC TROMETHAMINE 30 MILLIGRAM(S): 30 INJECTION, SOLUTION INTRAMUSCULAR; INTRAVENOUS at 11:46

## 2025-03-19 NOTE — ED ADULT TRIAGE NOTE - CHIEF COMPLAINT QUOTE
Presented to ED c/o lower back pain rad to left leg since Sunday and dizziness with room spinning since last night. Has h/o vertigo. FS = 192

## 2025-03-19 NOTE — ED PROVIDER NOTE - PATIENT PORTAL LINK FT
You can access the FollowMyHealth Patient Portal offered by [facility] by registering at the following website: [email or web address]. By joining [employer]’s FollowMyHealth portal, you will also be able to view your health information using other applications (apps) compatible with our system.

## 2025-03-19 NOTE — ED PROVIDER NOTE - NS ED ROS FT
Review of Systems:  	•	CONSTITUTIONAL - no fever, no diaphoresis, no chills  	•	SKIN - no rash  	•	HEMATOLOGIC - no bleeding, no bruising  	•	EYES - no eye pain, no blurry vision  	•	ENT - no congestion  	•	RESPIRATORY - no shortness of breath, no cough  	•	CARDIAC - no chest pain, no palpitations  	•	GI - no abd pain, no nausea, no vomiting, no diarrhea, no constipation  	•	GENITO-URINARY - no dysuria; no hematuria, no increased urinary frequency  	•	MUSCULOSKELETAL - +joint paint   	•	NEUROLOGIC - no weakness, no headache, +dizziness  	All other ROS are negative except as documented in HPI.

## 2025-03-19 NOTE — ED ADULT NURSE NOTE - NSSUHOSCREENINGYN_ED_ALL_ED
Quality 431: Preventive Care And Screening: Unhealthy Alcohol Use - Screening: Patient not identified as an unhealthy alcohol user when screened for unhealthy alcohol use using a systematic screening method
Quality 226: Preventive Care And Screening: Tobacco Use: Screening And Cessation Intervention: Patient screened for tobacco use and is an ex/non-smoker
Detail Level: Detailed
Quality 130: Documentation Of Current Medications In The Medical Record: Current Medications Documented
Yes - the patient is able to be screened

## 2025-03-19 NOTE — ED PROVIDER NOTE - CLINICAL SUMMARY MEDICAL DECISION MAKING FREE TEXT BOX
Labs and EKG were ordered and reviewed.  Imaging was ordered and reviewed by me.  Appropriate medications for patient's presenting complaints were ordered and effects were reassessed.  Patient's records (prior hospital, ED visit, and/or nursing home notes if available) were reviewed.  Additional history was obtained from EMS, family, and/or PCP (where available).  Escalation to admission/observation was considered.  However, patient had significant improvement in ED and was ambulating.  Much improved and stable for discharge with close outpatient follow up.

## 2025-03-19 NOTE — ED PROVIDER NOTE - NSFOLLOWUPINSTRUCTIONS_ED_ALL_ED_FT
You presented to the hospital due to left low back pain and dizziness.  Your symptoms are consistent with sciatica and vertigo.  It is recommended that you follow up with your primary care doctor in 2-3 days and schedule an appointment with a pain management specialist.  Return to your nearest hospital if any symptoms change, worsen or if new symptoms arise.       Our Emergency Department Referral Coordinators will be reaching out to you in the next 24-48 hours from 9:00am to 5:00pm with a follow up appointment. Please expect a phone call from the hospital in that time frame. If you do not receive a call or if you have any questions or concerns, you can reach them at [phone] or [phone].

## 2025-03-19 NOTE — ED PROVIDER NOTE - OBJECTIVE STATEMENT
54 F with PMH of HTN, fibromyalgia, degenerative disc disease presents to the ER with c/o left low back pain radiating down left leg x 5 days and room spinning dizziness x yesterday. Pt states symptoms of pain are consistent with prior sciatica pain exacerbations and dizziness is consistent with her prior vertigo. Patient reports she has been taking gabapentin and diclofenac for pain without relief. Previously has seen pain management for sciatica and received epidural injections, last 2 years ago, now only following with her PCP. Denies fall, injury, weakness, vision changes, vomiting, urinary sx, HA or other complaints.

## 2025-03-19 NOTE — ED PROVIDER NOTE - IV ALTEPLASE DOOR HIDDEN
-- DO NOT REPLY / DO NOT REPLY ALL --  -- Message is from the [facility]--      Patient is requesting a medication refill - medication is on active list    Was Medication Pended? Yes.    Rx Name and Dose:    metoPROLOL succinate (TOPROL-XL) 25 MG 24 hr tablet 25 mg, Oral, DAILY     losartan (COZAAR) 50 MG tablet 50 mg, Oral, DAILY     HYDROcodone-acetaminophen (NORCO) 5-325 MG per tablet 1 tablet, Oral, 3 TIMES DAILY            cyclobenzaprine (FLEXERIL) 10 MG tablet 10 mg, Oral, 3 TIMES DAILY       Duration: 90 days    Pharmacy  [id number] - [address] [facility]    Patient confirmed the above pharmacy as correct?  Yes    Caller Information       Type Contact Phone    01/05/2021 09:21 AM CST Phone (Incoming) Aimee Hodges (Self) [phone] (M)          Alternative phone number: [phone]    Turnaround time given to caller:   \"This message will be sent to [state Provider's name]. The clinical team will fulfill your request as soon as they review your message.\"  
show

## 2025-03-19 NOTE — ED PROVIDER NOTE - PHYSICAL EXAMINATION
VITAL SIGNS: I have reviewed nursing notes and confirm.  CONSTITUTIONAL: Patient is in no acute distress.  SKIN: Skin exam is warm and dry, no acute rash.  HEAD: Normocephalic; atraumatic.  EYES: EOM intact; conjunctiva and sclera clear.  ENT: No nasal discharge  CARD: S1, S2 normal; Regular rate and rhythm.  RESP: Clear to auscultation bilaterally. No wheezes, rales or rhonchi.  ABD: soft; non-distended; non-tender.   EXT: Normal ROM. No edema. +Left lumbosacral TTP, no midline spine TTP.   NEURO: Alert, oriented. Grossly unremarkable. No focal deficits. UE/LE strength 5/5, sensation intact, no pronator drift, no facial droop or slurred speech.  PSYCH: Cooperative, appropriate.
no fever and no chills.

## 2025-03-20 NOTE — CHART NOTE - NSCHARTNOTEFT_GEN_A_CORE
[facility] MRN [id number] - left  3/20 - DK / Pt c/b and communicated she already has upcoming appt 3/20 - JL    SPECIALTY: pain management

## 2025-03-25 ENCOUNTER — APPOINTMENT (OUTPATIENT)
Dept: PAIN MANAGEMENT | Facility: CLINIC | Age: 55
End: 2025-03-25
Payer: MEDICAID

## 2025-03-25 DIAGNOSIS — M54.16 RADICULOPATHY, LUMBAR REGION: ICD-10-CM

## 2025-03-25 DIAGNOSIS — M54.50 LOW BACK PAIN, UNSPECIFIED: ICD-10-CM

## 2025-03-25 PROCEDURE — 99203 OFFICE O/P NEW LOW 30 MIN: CPT

## 2025-03-25 RX ORDER — METHYLPREDNISOLONE 4 MG/1
4 TABLET ORAL
Qty: 1 | Refills: 0 | Status: ACTIVE | COMMUNITY
Start: 2025-03-25 | End: 1900-01-01

## 2025-03-26 ENCOUNTER — APPOINTMENT (OUTPATIENT)
Dept: PAIN MANAGEMENT | Facility: CLINIC | Age: 55
End: 2025-03-26

## 2025-04-30 RX ADMIN — OXYCODONE HYDROCHLORIDE 5 MILLIGRAM(S): 30 TABLET ORAL at 21:45

## 2025-05-13 ENCOUNTER — APPOINTMENT (OUTPATIENT)
Dept: PAIN MANAGEMENT | Facility: CLINIC | Age: 55
End: 2025-05-13

## 2025-05-16 ENCOUNTER — OUTPATIENT (OUTPATIENT)
Dept: OUTPATIENT SERVICES | Facility: HOSPITAL | Age: 55
LOS: 1 days | End: 2025-05-16
Payer: MEDICAID

## 2025-05-16 DIAGNOSIS — Z98.890 OTHER SPECIFIED POSTPROCEDURAL STATES: Chronic | ICD-10-CM

## 2025-05-16 DIAGNOSIS — Z98.891 HISTORY OF UTERINE SCAR FROM PREVIOUS SURGERY: Chronic | ICD-10-CM

## 2025-05-16 DIAGNOSIS — Z12.31 ENCOUNTER FOR SCREENING MAMMOGRAM FOR MALIGNANT NEOPLASM OF BREAST: ICD-10-CM

## 2025-05-16 PROCEDURE — 77067 SCR MAMMO BI INCL CAD: CPT

## 2025-05-16 PROCEDURE — 77063 BREAST TOMOSYNTHESIS BI: CPT

## 2025-05-16 PROCEDURE — 77063 BREAST TOMOSYNTHESIS BI: CPT | Mod: 26

## 2025-05-16 PROCEDURE — 77067 SCR MAMMO BI INCL CAD: CPT | Mod: 26

## 2025-05-17 DIAGNOSIS — Z12.31 ENCOUNTER FOR SCREENING MAMMOGRAM FOR MALIGNANT NEOPLASM OF BREAST: ICD-10-CM

## 2025-05-27 ENCOUNTER — INPATIENT (INPATIENT)
Facility: HOSPITAL | Age: 55
LOS: 7 days | Discharge: HOME | DRG: 347 | End: 2025-06-04
Attending: INTERNAL MEDICINE | Admitting: STUDENT IN AN ORGANIZED HEALTH CARE EDUCATION/TRAINING PROGRAM
Payer: MEDICAID

## 2025-05-27 VITALS
SYSTOLIC BLOOD PRESSURE: 125 MMHG | WEIGHT: 279.11 LBS | OXYGEN SATURATION: 99 % | HEIGHT: 66 IN | RESPIRATION RATE: 20 BRPM | TEMPERATURE: 100 F | DIASTOLIC BLOOD PRESSURE: 82 MMHG | HEART RATE: 109 BPM

## 2025-05-27 DIAGNOSIS — Z98.891 HISTORY OF UTERINE SCAR FROM PREVIOUS SURGERY: Chronic | ICD-10-CM

## 2025-05-27 DIAGNOSIS — Z98.890 OTHER SPECIFIED POSTPROCEDURAL STATES: Chronic | ICD-10-CM

## 2025-05-27 LAB
BASOPHILS # BLD AUTO: 0.04 K/UL — SIGNIFICANT CHANGE UP (ref 0–0.2)
BASOPHILS NFR BLD AUTO: 0.4 % — SIGNIFICANT CHANGE UP (ref 0–1)
EOSINOPHIL # BLD AUTO: 0.28 K/UL — SIGNIFICANT CHANGE UP (ref 0–0.7)
EOSINOPHIL NFR BLD AUTO: 2.9 % — SIGNIFICANT CHANGE UP (ref 0–8)
HCT VFR BLD CALC: 39.5 % — SIGNIFICANT CHANGE UP (ref 37–47)
HGB BLD-MCNC: 13.4 G/DL — SIGNIFICANT CHANGE UP (ref 12–16)
IMM GRANULOCYTES NFR BLD AUTO: 0.3 % — SIGNIFICANT CHANGE UP (ref 0.1–0.3)
LYMPHOCYTES # BLD AUTO: 3.75 K/UL — HIGH (ref 1.2–3.4)
LYMPHOCYTES # BLD AUTO: 38.2 % — SIGNIFICANT CHANGE UP (ref 20.5–51.1)
MCHC RBC-ENTMCNC: 29.6 PG — SIGNIFICANT CHANGE UP (ref 27–31)
MCHC RBC-ENTMCNC: 33.9 G/DL — SIGNIFICANT CHANGE UP (ref 32–37)
MCV RBC AUTO: 87.4 FL — SIGNIFICANT CHANGE UP (ref 81–99)
MONOCYTES # BLD AUTO: 0.52 K/UL — SIGNIFICANT CHANGE UP (ref 0.1–0.6)
MONOCYTES NFR BLD AUTO: 5.3 % — SIGNIFICANT CHANGE UP (ref 1.7–9.3)
NEUTROPHILS # BLD AUTO: 5.2 K/UL — SIGNIFICANT CHANGE UP (ref 1.4–6.5)
NEUTROPHILS NFR BLD AUTO: 52.9 % — SIGNIFICANT CHANGE UP (ref 42.2–75.2)
NRBC BLD AUTO-RTO: 0 /100 WBCS — SIGNIFICANT CHANGE UP (ref 0–0)
PLATELET # BLD AUTO: 337 K/UL — SIGNIFICANT CHANGE UP (ref 130–400)
PMV BLD: 10.4 FL — SIGNIFICANT CHANGE UP (ref 7.4–10.4)
RBC # BLD: 4.52 M/UL — SIGNIFICANT CHANGE UP (ref 4.2–5.4)
RBC # FLD: 13.6 % — SIGNIFICANT CHANGE UP (ref 11.5–14.5)
WBC # BLD: 9.82 K/UL — SIGNIFICANT CHANGE UP (ref 4.8–10.8)
WBC # FLD AUTO: 9.82 K/UL — SIGNIFICANT CHANGE UP (ref 4.8–10.8)

## 2025-05-27 PROCEDURE — 71045 X-RAY EXAM CHEST 1 VIEW: CPT | Mod: 26

## 2025-05-27 PROCEDURE — 99285 EMERGENCY DEPT VISIT HI MDM: CPT

## 2025-05-27 PROCEDURE — 93010 ELECTROCARDIOGRAM REPORT: CPT

## 2025-05-27 RX ORDER — ACETAMINOPHEN 500 MG/5ML
975 LIQUID (ML) ORAL ONCE
Refills: 0 | Status: COMPLETED | OUTPATIENT
Start: 2025-05-27 | End: 2025-05-27

## 2025-05-27 RX ORDER — METHOCARBAMOL 500 MG/1
1000 TABLET, FILM COATED ORAL ONCE
Refills: 0 | Status: COMPLETED | OUTPATIENT
Start: 2025-05-27 | End: 2025-05-27

## 2025-05-27 RX ADMIN — Medication 975 MILLIGRAM(S): at 23:29

## 2025-05-27 RX ADMIN — Medication 4 MILLIGRAM(S): at 23:31

## 2025-05-27 RX ADMIN — METHOCARBAMOL 1000 MILLIGRAM(S): 500 TABLET, FILM COATED ORAL at 23:30

## 2025-05-27 NOTE — ED PROVIDER NOTE - OBJECTIVE STATEMENT
Patient is a 54-year-old female PMH hypertension, hyperlipidemia, diabetes, degenerative disc disease, herniated disks, fibromyalgia, PTSD coming to ED for back pain and syncope.  Patient reports exacerbation of left-sided sciatica since March that worsened over the last week.  Patient follows with pain management and has been taking steroids/gabapentin/Cymbalta/Excedrin with no relief.  Had MRI done years ago but nothing recent.  Patient reports 3 days ago had syncopal episode in the bathroom while standing and began to feel very lightheaded fell forward landing on bilateral forearms onto toilet, was unconscious for few seconds, but was unable to get up after waking up due to exacerbation of back pain.  Denies headache, head trauma, neck pain, chest pain, abdominal pain, no shortness of breath, nausea vomiting diarrhea constipation, unilateral numbness/weakness

## 2025-05-27 NOTE — ED ADULT TRIAGE NOTE - CHIEF COMPLAINT QUOTE
Her left sciatica is getting worse for about a month - daughter  Patient reports she has been on steroids , tizanidine, diflocinase ,, seeing pain management, requesting MRI

## 2025-05-27 NOTE — ED ADULT TRIAGE NOTE - ARRIVAL FROM
Home icing instructions     The application of ice has shown to:         Decrease swelling         Decrease muscle spasm         Decrease local pain         Increase endorphin (natural body painkiller) production      Please remember to ice in this order:       1.  Moisten a small towel with very warm/hot water and ring out        2. Place the warm towel on your bare skin       3.  Place the ice pack on top of the moist towel       4. Cinch the area with a large dry towel to hold it in place.       5. Leave on for 5 minutes. Leaving the ice on longer than recommended will have the opposite effect by triggering a response which increases blood flow to the area.    Four times a day is ideal, if at all possible                       Home

## 2025-05-27 NOTE — ED PROVIDER NOTE - PHYSICAL EXAMINATION
CONST: in NAD  EYES: EOMI, Sclera and conjunctiva clear.   ENT: No nasal discharge. Oropharynx normal appearing, no erythema or exudates. Uvula midline.  NECK: Non-tender  CARD: Normal S1 S2; Normal rate and rhythm  RESP: Equal BS B/L, No wheezes, rhonchi or rales. No distress  GI: Soft, non-tender, non-distended.  MS: Normal ROM in all extremities. No midline spinal tenderness. TTP L low back, + straight leg  SKIN: Warm, dry, no acute rashes. Good turgor  NEURO: A&Ox3, No focal deficits. Strength 5/5 with no sensory deficits

## 2025-05-27 NOTE — ED PROVIDER NOTE - CLINICAL SUMMARY MEDICAL DECISION MAKING FREE TEXT BOX
54-year-old female PMH hypertension, hyperlipidemia, diabetes, degenerative disc disease, herniated disks, fibromyalgia, PTSD coming to ED for back pain and syncope.  Patient reports exacerbation of left-sided sciatica since March that worsened over the last week.  Patient follows with pain management and has been taking steroids/gabapentin/Cymbalta/Excedrin with no relief.  Had MRI done years ago but nothing recent.  Patient reports 3 days ago had syncopal episode in the bathroom while standing and began to feel very lightheaded fell forward landing on bilateral forearms onto toilet, was unconscious for few seconds, but was unable to get up after waking up due to exacerbation of back pain.  Denies headache, head trauma, neck pain, chest pain, abdominal pain, no shortness of breath, nausea vomiting diarrhea constipation, unilateral numbness/weakness.  Exam cw with sciatica today with no relief from multiple different oral meds, and pt also syncopized--EKG/CXR/labs reviewed. Recommend admission to inpatient for further workup

## 2025-05-28 DIAGNOSIS — R55 SYNCOPE AND COLLAPSE: ICD-10-CM

## 2025-05-28 LAB
ALBUMIN SERPL ELPH-MCNC: 4 G/DL — SIGNIFICANT CHANGE UP (ref 3.5–5.2)
ALBUMIN SERPL ELPH-MCNC: 4.4 G/DL — SIGNIFICANT CHANGE UP (ref 3.5–5.2)
ALP SERPL-CCNC: 84 U/L — SIGNIFICANT CHANGE UP (ref 30–115)
ALP SERPL-CCNC: 90 U/L — SIGNIFICANT CHANGE UP (ref 30–115)
ALT FLD-CCNC: 26 U/L — SIGNIFICANT CHANGE UP (ref 0–41)
ALT FLD-CCNC: 27 U/L — SIGNIFICANT CHANGE UP (ref 0–41)
ANION GAP SERPL CALC-SCNC: 12 MMOL/L — SIGNIFICANT CHANGE UP (ref 7–14)
ANION GAP SERPL CALC-SCNC: 13 MMOL/L — SIGNIFICANT CHANGE UP (ref 7–14)
AST SERPL-CCNC: 22 U/L — SIGNIFICANT CHANGE UP (ref 0–41)
AST SERPL-CCNC: 43 U/L — HIGH (ref 0–41)
BILIRUB SERPL-MCNC: 0.5 MG/DL — SIGNIFICANT CHANGE UP (ref 0.2–1.2)
BILIRUB SERPL-MCNC: 0.7 MG/DL — SIGNIFICANT CHANGE UP (ref 0.2–1.2)
BUN SERPL-MCNC: 20 MG/DL — SIGNIFICANT CHANGE UP (ref 10–20)
BUN SERPL-MCNC: 23 MG/DL — HIGH (ref 10–20)
CALCIUM SERPL-MCNC: 10.4 MG/DL — SIGNIFICANT CHANGE UP (ref 8.4–10.5)
CALCIUM SERPL-MCNC: 9.8 MG/DL — SIGNIFICANT CHANGE UP (ref 8.4–10.5)
CHLORIDE SERPL-SCNC: 98 MMOL/L — SIGNIFICANT CHANGE UP (ref 98–110)
CHLORIDE SERPL-SCNC: 99 MMOL/L — SIGNIFICANT CHANGE UP (ref 98–110)
CO2 SERPL-SCNC: 22 MMOL/L — SIGNIFICANT CHANGE UP (ref 17–32)
CO2 SERPL-SCNC: 27 MMOL/L — SIGNIFICANT CHANGE UP (ref 17–32)
CREAT SERPL-MCNC: 0.9 MG/DL — SIGNIFICANT CHANGE UP (ref 0.7–1.5)
CREAT SERPL-MCNC: 0.9 MG/DL — SIGNIFICANT CHANGE UP (ref 0.7–1.5)
EGFR: 76 ML/MIN/1.73M2 — SIGNIFICANT CHANGE UP
GAS PNL BLDV: SIGNIFICANT CHANGE UP
GLUCOSE BLDC GLUCOMTR-MCNC: 102 MG/DL — HIGH (ref 70–99)
GLUCOSE SERPL-MCNC: 109 MG/DL — HIGH (ref 70–99)
GLUCOSE SERPL-MCNC: 117 MG/DL — HIGH (ref 70–99)
HCG SERPL QL: NEGATIVE — SIGNIFICANT CHANGE UP
MAGNESIUM SERPL-MCNC: 2.1 MG/DL — SIGNIFICANT CHANGE UP (ref 1.8–2.4)
NT-PROBNP SERPL-SCNC: <36 PG/ML — SIGNIFICANT CHANGE UP (ref 0–300)
POTASSIUM SERPL-MCNC: 4.9 MMOL/L — SIGNIFICANT CHANGE UP (ref 3.5–5)
POTASSIUM SERPL-MCNC: 5.9 MMOL/L — HIGH (ref 3.5–5)
POTASSIUM SERPL-SCNC: 4.9 MMOL/L — SIGNIFICANT CHANGE UP (ref 3.5–5)
POTASSIUM SERPL-SCNC: 5.9 MMOL/L — HIGH (ref 3.5–5)
PROT SERPL-MCNC: 7.3 G/DL — SIGNIFICANT CHANGE UP (ref 6–8)
PROT SERPL-MCNC: 7.8 G/DL — SIGNIFICANT CHANGE UP (ref 6–8)
SODIUM SERPL-SCNC: 134 MMOL/L — LOW (ref 135–146)
SODIUM SERPL-SCNC: 137 MMOL/L — SIGNIFICANT CHANGE UP (ref 135–146)
TROPONIN T, HIGH SENSITIVITY RESULT: 6 NG/L — SIGNIFICANT CHANGE UP (ref 6–13)
TROPONIN T, HIGH SENSITIVITY RESULT: 7 NG/L — SIGNIFICANT CHANGE UP (ref 6–13)
TSH SERPL-MCNC: 2.03 UIU/ML — SIGNIFICANT CHANGE UP (ref 0.27–4.2)

## 2025-05-28 PROCEDURE — 97162 PT EVAL MOD COMPLEX 30 MIN: CPT | Mod: GP

## 2025-05-28 PROCEDURE — 83935 ASSAY OF URINE OSMOLALITY: CPT

## 2025-05-28 PROCEDURE — 84156 ASSAY OF PROTEIN URINE: CPT

## 2025-05-28 PROCEDURE — 81001 URINALYSIS AUTO W/SCOPE: CPT

## 2025-05-28 PROCEDURE — 84295 ASSAY OF SERUM SODIUM: CPT

## 2025-05-28 PROCEDURE — 82550 ASSAY OF CK (CPK): CPT

## 2025-05-28 PROCEDURE — 97116 GAIT TRAINING THERAPY: CPT | Mod: GP

## 2025-05-28 PROCEDURE — 97530 THERAPEUTIC ACTIVITIES: CPT | Mod: GP

## 2025-05-28 PROCEDURE — 85014 HEMATOCRIT: CPT

## 2025-05-28 PROCEDURE — 82962 GLUCOSE BLOOD TEST: CPT

## 2025-05-28 PROCEDURE — 76770 US EXAM ABDO BACK WALL COMP: CPT

## 2025-05-28 PROCEDURE — 80053 COMPREHEN METABOLIC PANEL: CPT

## 2025-05-28 PROCEDURE — 97110 THERAPEUTIC EXERCISES: CPT | Mod: GP

## 2025-05-28 PROCEDURE — 85018 HEMOGLOBIN: CPT

## 2025-05-28 PROCEDURE — 86901 BLOOD TYPING SEROLOGIC RH(D): CPT

## 2025-05-28 PROCEDURE — 72020 X-RAY EXAM OF SPINE 1 VIEW: CPT

## 2025-05-28 PROCEDURE — 85730 THROMBOPLASTIN TIME PARTIAL: CPT

## 2025-05-28 PROCEDURE — 84133 ASSAY OF URINE POTASSIUM: CPT

## 2025-05-28 PROCEDURE — 83735 ASSAY OF MAGNESIUM: CPT

## 2025-05-28 PROCEDURE — 82330 ASSAY OF CALCIUM: CPT

## 2025-05-28 PROCEDURE — 84300 ASSAY OF URINE SODIUM: CPT

## 2025-05-28 PROCEDURE — 86160 COMPLEMENT ANTIGEN: CPT

## 2025-05-28 PROCEDURE — 86900 BLOOD TYPING SEROLOGIC ABO: CPT

## 2025-05-28 PROCEDURE — 80048 BASIC METABOLIC PNL TOTAL CA: CPT

## 2025-05-28 PROCEDURE — 86850 RBC ANTIBODY SCREEN: CPT

## 2025-05-28 PROCEDURE — 72148 MRI LUMBAR SPINE W/O DYE: CPT

## 2025-05-28 PROCEDURE — 99223 1ST HOSP IP/OBS HIGH 75: CPT

## 2025-05-28 PROCEDURE — 85025 COMPLETE CBC W/AUTO DIFF WBC: CPT

## 2025-05-28 PROCEDURE — 82803 BLOOD GASES ANY COMBINATION: CPT

## 2025-05-28 PROCEDURE — 84443 ASSAY THYROID STIM HORMONE: CPT

## 2025-05-28 PROCEDURE — 84540 ASSAY OF URINE/UREA-N: CPT

## 2025-05-28 PROCEDURE — 82570 ASSAY OF URINE CREATININE: CPT

## 2025-05-28 PROCEDURE — 83605 ASSAY OF LACTIC ACID: CPT

## 2025-05-28 PROCEDURE — 36415 COLL VENOUS BLD VENIPUNCTURE: CPT

## 2025-05-28 PROCEDURE — 85610 PROTHROMBIN TIME: CPT

## 2025-05-28 PROCEDURE — 84132 ASSAY OF SERUM POTASSIUM: CPT

## 2025-05-28 RX ORDER — TIZANIDINE 4 MG/1
4 TABLET ORAL EVERY 6 HOURS
Refills: 0 | Status: DISCONTINUED | OUTPATIENT
Start: 2025-05-28 | End: 2025-06-04

## 2025-05-28 RX ORDER — GABAPENTIN 400 MG/1
300 CAPSULE ORAL
Refills: 0 | Status: DISCONTINUED | OUTPATIENT
Start: 2025-05-28 | End: 2025-06-04

## 2025-05-28 RX ORDER — PREDNISONE 20 MG/1
80 TABLET ORAL DAILY
Refills: 0 | Status: DISCONTINUED | OUTPATIENT
Start: 2025-05-28 | End: 2025-06-01

## 2025-05-28 RX ORDER — OXYCODONE HYDROCHLORIDE 30 MG/1
5 TABLET ORAL
Refills: 0 | Status: DISCONTINUED | OUTPATIENT
Start: 2025-05-28 | End: 2025-06-04

## 2025-05-28 RX ORDER — DULOXETINE 20 MG/1
1 CAPSULE, DELAYED RELEASE ORAL
Refills: 0 | DISCHARGE

## 2025-05-28 RX ORDER — DULOXETINE 20 MG/1
60 CAPSULE, DELAYED RELEASE ORAL DAILY
Refills: 0 | Status: DISCONTINUED | OUTPATIENT
Start: 2025-05-28 | End: 2025-06-04

## 2025-05-28 RX ORDER — LOVASTATIN 20 MG/1
1 TABLET ORAL
Refills: 0 | DISCHARGE

## 2025-05-28 RX ORDER — LISINOPRIL 5 MG/1
40 TABLET ORAL DAILY
Refills: 0 | Status: DISCONTINUED | OUTPATIENT
Start: 2025-05-28 | End: 2025-05-29

## 2025-05-28 RX ORDER — OXYCODONE HYDROCHLORIDE 30 MG/1
5 TABLET ORAL ONCE
Refills: 0 | Status: DISCONTINUED | OUTPATIENT
Start: 2025-05-28 | End: 2025-05-28

## 2025-05-28 RX ORDER — GABAPENTIN 400 MG/1
600 CAPSULE ORAL DAILY
Refills: 0 | Status: DISCONTINUED | OUTPATIENT
Start: 2025-05-28 | End: 2025-05-29

## 2025-05-28 RX ORDER — ACETAMINOPHEN 500 MG/5ML
975 LIQUID (ML) ORAL EVERY 6 HOURS
Refills: 0 | Status: DISCONTINUED | OUTPATIENT
Start: 2025-05-28 | End: 2025-06-04

## 2025-05-28 RX ORDER — OLANZAPINE 10 MG/1
5 TABLET ORAL DAILY
Refills: 0 | Status: DISCONTINUED | OUTPATIENT
Start: 2025-05-28 | End: 2025-06-04

## 2025-05-28 RX ORDER — METHOCARBAMOL 500 MG/1
500 TABLET, FILM COATED ORAL ONCE
Refills: 0 | Status: COMPLETED | OUTPATIENT
Start: 2025-05-28 | End: 2025-05-28

## 2025-05-28 RX ORDER — SENNA 187 MG
2 TABLET ORAL AT BEDTIME
Refills: 0 | Status: DISCONTINUED | OUTPATIENT
Start: 2025-05-28 | End: 2025-06-04

## 2025-05-28 RX ORDER — OXYCODONE HYDROCHLORIDE 30 MG/1
5 TABLET ORAL
Refills: 0 | Status: DISCONTINUED | OUTPATIENT
Start: 2025-05-28 | End: 2025-05-28

## 2025-05-28 RX ORDER — LIDOCAINE HYDROCHLORIDE 20 MG/ML
1 JELLY TOPICAL ONCE
Refills: 0 | Status: COMPLETED | OUTPATIENT
Start: 2025-05-28 | End: 2025-05-28

## 2025-05-28 RX ORDER — GABAPENTIN 400 MG/1
0 CAPSULE ORAL
Refills: 0 | DISCHARGE

## 2025-05-28 RX ORDER — KETOROLAC TROMETHAMINE 30 MG/ML
15 INJECTION, SOLUTION INTRAMUSCULAR; INTRAVENOUS EVERY 6 HOURS
Refills: 0 | Status: DISCONTINUED | OUTPATIENT
Start: 2025-05-28 | End: 2025-05-30

## 2025-05-28 RX ORDER — OLANZAPINE 10 MG/1
1 TABLET ORAL
Refills: 0 | DISCHARGE

## 2025-05-28 RX ORDER — GABAPENTIN 400 MG/1
1 CAPSULE ORAL
Refills: 0 | DISCHARGE

## 2025-05-28 RX ORDER — KETOROLAC TROMETHAMINE 30 MG/ML
15 INJECTION, SOLUTION INTRAMUSCULAR; INTRAVENOUS ONCE
Refills: 0 | Status: DISCONTINUED | OUTPATIENT
Start: 2025-05-28 | End: 2025-05-28

## 2025-05-28 RX ORDER — ATORVASTATIN CALCIUM 80 MG/1
40 TABLET, FILM COATED ORAL AT BEDTIME
Refills: 0 | Status: DISCONTINUED | OUTPATIENT
Start: 2025-05-28 | End: 2025-06-04

## 2025-05-28 RX ORDER — DULOXETINE 20 MG/1
20 CAPSULE, DELAYED RELEASE ORAL AT BEDTIME
Refills: 0 | Status: DISCONTINUED | OUTPATIENT
Start: 2025-05-28 | End: 2025-06-04

## 2025-05-28 RX ORDER — ENOXAPARIN SODIUM 100 MG/ML
40 INJECTION SUBCUTANEOUS EVERY 24 HOURS
Refills: 0 | Status: DISCONTINUED | OUTPATIENT
Start: 2025-05-28 | End: 2025-06-02

## 2025-05-28 RX ADMIN — TIZANIDINE 4 MILLIGRAM(S): 4 TABLET ORAL at 20:34

## 2025-05-28 RX ADMIN — ENOXAPARIN SODIUM 40 MILLIGRAM(S): 100 INJECTION SUBCUTANEOUS at 21:57

## 2025-05-28 RX ADMIN — PREDNISONE 80 MILLIGRAM(S): 20 TABLET ORAL at 16:59

## 2025-05-28 RX ADMIN — OXYCODONE HYDROCHLORIDE 5 MILLIGRAM(S): 30 TABLET ORAL at 12:08

## 2025-05-28 RX ADMIN — LISINOPRIL 40 MILLIGRAM(S): 5 TABLET ORAL at 20:49

## 2025-05-28 RX ADMIN — METHOCARBAMOL 500 MILLIGRAM(S): 500 TABLET, FILM COATED ORAL at 16:58

## 2025-05-28 RX ADMIN — Medication 2 TABLET(S): at 21:56

## 2025-05-28 RX ADMIN — GABAPENTIN 300 MILLIGRAM(S): 400 CAPSULE ORAL at 16:56

## 2025-05-28 RX ADMIN — ATORVASTATIN CALCIUM 40 MILLIGRAM(S): 80 TABLET, FILM COATED ORAL at 21:56

## 2025-05-28 RX ADMIN — DULOXETINE 20 MILLIGRAM(S): 20 CAPSULE, DELAYED RELEASE ORAL at 21:57

## 2025-05-28 RX ADMIN — LIDOCAINE HYDROCHLORIDE 1 PATCH: 20 JELLY TOPICAL at 14:56

## 2025-05-28 RX ADMIN — Medication 40 MILLIGRAM(S): at 16:59

## 2025-05-28 RX ADMIN — KETOROLAC TROMETHAMINE 15 MILLIGRAM(S): 30 INJECTION, SOLUTION INTRAMUSCULAR; INTRAVENOUS at 15:56

## 2025-05-28 NOTE — PATIENT PROFILE ADULT - FALL HARM RISK - RISK INTERVENTIONS

## 2025-05-28 NOTE — H&P ADULT - ASSESSMENT
#left Sciatica   #severe left leg pain   - Pain management consulted  - Pt took prednisolone in the past that improved her pain but pain returned shortly after stopping meds  - Had steroid injection 3 yrs ago that helped and allowed her to participate in physical therapy  - Walks with cane but with difficulty and pain  - She is on maximal pain therapy except for opiates at home  - start prednisone 80mg qd  - morphine 4mg q4 PRN for severe pain  - c/w gabapentin 300mg in AM, 300mg at noon  and 600mg at bedtime  - c/w toradol 15 q6hr  - c/w duloxetine 60mg in AM and 20mg ion PM  - EMG and nerve conduction studies   - MRI spine if EMGand nerve conduction studies are abnormal  - PT/OT  - monitor BP and FS with steroid therapy  - Monitor FS with steroid therapy  -     #syncope  #Vertigo  - unlikely syncopal episode  - may be related to the vomit/nausea  - d/c telemtry   - Meclizine PRN    #HTN  - c/w lisinoprile and HCTZ  - monitor BP with NSAID treatmetn adn prednisone therapy    #Depression  #PTSD  #Fibromuyalgia  - c/w olanzapine 5mg qd  - c/w duloxetine 60 in am and 20 in PM    #DL  -  c/w atorvastatin    #Dispo Med/surg floor   #left Sciatica   #severe left leg pain , without neuromuscular deficit on exam   - Pain management consulted  - Pt took prednisolone in the past that improved her pain but pain returned shortly after stopping meds  - Had steroid injection 3 yrs ago that helped and allowed her to participate in physical therapy  - Walks with cane but with difficulty and pain  - She is on maximal pain therapy except for opiates at home  - start prednisone 80mg qd  - morphine 4mg q4 PRN for severe pain  - c/w gabapentin 300mg in AM, 300mg at noon  and 600mg at bedtime  - c/w toradol 15 q6hr  - c/w duloxetine 60mg in AM and 20mg ion PM  - EMG and nerve conduction studies   - MRI spine planned as outpatient  if EMG and nerve conduction studies are abnormal (scheduled for this upcoming Tuesday with her physician)   - PT/OT  - monitor BP and FS with steroid therapy  - Monitor FS with steroid therapy  -     #syncope  #Vertigo  - unlikely syncopal episode  - may be related to the vomit/nausea  - d/c telemtry   - Meclizine PRN    #HTN  - c/w lisinoprile and HCTZ  - monitor BP with NSAID treatmetn adn prednisone therapy    #Depression  #PTSD  #Fibromuyalgia  - c/w olanzapine 5mg qd  - c/w duloxetine 60 in am and 20 in PM    #DL  -  c/w atorvastatin    #Dispo Med/surg floor

## 2025-05-28 NOTE — H&P ADULT - ATTENDING COMMENTS
Patient seen and examined independently in ED3.   No evidence of neuromuscular deficit on physical exam of lower extremities     PAST MEDICAL & SURGICAL HISTORY:  HTN (hypertension)  Fibromyalgia  PTSD (post-traumatic stress disorder)  Autoimmune hepatitis  DJD (degenerative joint disease)  HLD (hyperlipidemia)  Anxiety  PTS  Depression  Obesity      Vitals:  T(F): 98.2 (05-28-25 @ 16:39)  HR: 112 (05-28-25 @ 16:39)  BP: 124/79 (05-28-25 @ 16:39)  RR: 18 (05-28-25 @ 16:39)  SpO2: 95% (05-28-25 @ 16:39)    TESTS & MEASUREMENTS:                        13.4   9.82  )-----------( 337      ( 27 May 2025 23:35 )             39.5       05-28    137  |  98  |  20  ----------------------------<  117[H]  4.9   |  27  |  0.9    Ca    10.4      28 May 2025 06:36  Mg     2.1     05-28    TPro  7.3  /  Alb  4.4  /  TBili  0.7  /  DBili  x   /  AST  22  /  ALT  26  /  AlkPhos  90  05-28    LIVER FUNCTIONS - ( 28 May 2025 06:36 )  Alb: 4.4 g/dL / Pro: 7.3 g/dL / ALK PHOS: 90 U/L / ALT: 26 U/L / AST: 22 U/L / GGT: x                 In summary:  HEALTH ISSUES - PROBLEM Dx:    - acute exacerbation of chronica sciatical pain (left LE)       - patient benefited in the past from locally delivered Toradol and steroidal injections, according to her, no longer covered by her insurance       - on NSAIDs, Peewee 300/300/600 , and muscle relaxants at baseline       - current worsening of pain likely due to recent stressor related to malfunction of elevator at patient's residence, leading to stair climbing   - Suspected transient syncopal episode without sequelae, likely related to vasovagal event as patient described acute episode of nausea and stomach pain preceding the event. No evidence of concerning radha marrero Patient seen and examined independently in ED3.   No evidence of neuromuscular deficit on physical exam of lower extremities     PAST MEDICAL & SURGICAL HISTORY:  HTN (hypertension)  Fibromyalgia  PTSD (post-traumatic stress disorder)  Autoimmune hepatitis  DJD (degenerative joint disease)  HLD (hyperlipidemia)  Anxiety  PTS  Depression  Obesity      Vitals:  T(F): 98.2 (05-28-25 @ 16:39)  HR: 112 (05-28-25 @ 16:39)  BP: 124/79 (05-28-25 @ 16:39)  RR: 18 (05-28-25 @ 16:39)  SpO2: 95% (05-28-25 @ 16:39)    TESTS & MEASUREMENTS:                        13.4   9.82  )-----------( 337      ( 27 May 2025 23:35 )             39.5       05-28    137  |  98  |  20  ----------------------------<  117[H]  4.9   |  27  |  0.9    Ca    10.4      28 May 2025 06:36  Mg     2.1     05-28    TPro  7.3  /  Alb  4.4  /  TBili  0.7  /  DBili  x   /  AST  22  /  ALT  26  /  AlkPhos  90  05-28    LIVER FUNCTIONS - ( 28 May 2025 06:36 )  Alb: 4.4 g/dL / Pro: 7.3 g/dL / ALK PHOS: 90 U/L / ALT: 26 U/L / AST: 22 U/L / GGT: x                 In summary:  HEALTH ISSUES - PROBLEM Dx:    - acute exacerbation of chronica sciatical pain (left LE)       - patient benefited in the past from locally delivered Toradol and steroidal injections, according to her, no longer covered by her insurance       - on NSAIDs, Peewee 300/300/600 , and muscle relaxants at baseline       - current worsening of pain likely due to recent stressor related to malfunction of elevator at patient's residence, leading to stair climbing   - Suspected transient syncopal episode without sequelae, likely related to vasovagal event as patient described acute episode of nausea and stomach pain preceding the event. No evidence of concerning g arrhythmias on ekg or cardiac monitoring   - rest of active issues as per detailed note above     PLAN   - 5-7 day course of oral steroids   - intravenous toradol then oral steroids   - pain management eval   - PT eval (patient uses cane at baseline, describes unsteadiness at times)   - No indication for MRI as inpatient at this time, patient is confirming that she is scheduled for EMG and NCS as outpatient on Tuesday; further neuro w/u (including imaging if needed) based on findings.   - no further cardiac w/u warranted as inpatient at this time     "prepare for discharge" notice provided and order entered.   Case discussed with resident assigned.

## 2025-05-28 NOTE — PATIENT PROFILE ADULT - NSPROPTRIGHTBILLOFRIGHTS_GEN_A_NUR
January 27, 2017        Re: Olivia Saha  [address] [place] [id number]      To whom it may concern:    This is to certify that Olivia Saha was seen at [facility] on 1/27/2017 for a sick visit.  She is being treated for pink eye. Please excuse her from school.  OLIVIA SAHA is cleared to return to school on Monday, 1/30/17.      Sincerely,          Mar Lemus MD, IBCLC    [facility] [address] [address]  [address] [address]  [phone]  [phone]        
patient

## 2025-05-28 NOTE — H&P ADULT - TIME BILLING
Direct clinical care, patient education and counseling,  coordination with consultants, nursing and case management/social work teams, review of tests and scheduled medications,  independent review of previous medical records and available test results, and resident supervision. Time spent on the encounter excludes teaching time and/or separately reported services.

## 2025-05-28 NOTE — ED ADULT NURSE NOTE - OBJECTIVE STATEMENT
[facility] OFFSHORE:  CG PHYS, AUDIO, PAE, VISION, PFT & OSHA    
pt c/o back pain, states her sciatica is getting worse for about a month and medication at home not working. pt had fall while in bathroom due to pain, states she did not hit her head, no loc.

## 2025-05-28 NOTE — ED ADULT NURSE NOTE - NSFALLHARMRISKINTERV_ED_ALL_ED

## 2025-05-28 NOTE — H&P ADULT - HISTORY OF PRESENT ILLNESS
54-year-old female PMH hypertension, hyperlipidemia, diabetes, degenerative disc disease, herniated disks, fibromyalgia, PTSD, left scitaica coming to ED for severe left lower back/buttock pain radiating down her posterior aspect of her left leg. Patient reports exacerbation of left-sided sciatica since mid March that worsened over the last week. She follows with pain management and has been taking steroids/gabapentin/Cymbalta/Excedrin with no relief.    Patient reports that she had vomiting and diarrhea due to "stomach bug" 4 days ago that has now resolved and that she had a syncopal? episode in the bathroom while trying to vomit but was able to break her fall by holding on to the toilet. She felt lightheaded and dizzy at the time.  Denies headache, head trauma, neck pain, chest pain, abdominal pain, no shortness of breath, nausea vomiting diarrhea constipation, unilateral numbness/weakness

## 2025-05-28 NOTE — H&P ADULT - NSHPLABSRESULTS_GEN_ALL_CORE
LABS:  cret                        13.4   9.82  )-----------( 337      ( 27 May 2025 23:35 )             39.5     05-28    137  |  98  |  20  ----------------------------<  117[H]  4.9   |  27  |  0.9    Ca    10.4      28 May 2025 06:36  Mg     2.1     05-28    TPro  7.3  /  Alb  4.4  /  TBili  0.7  /  DBili  x   /  AST  22  /  ALT  26  /  AlkPhos  90  05-28

## 2025-05-28 NOTE — H&P ADULT - NSHPPHYSICALEXAM_GEN_ALL_CORE
T(C): 36.7 (05-28-25 @ 07:32), Max: 37.6 (05-27-25 @ 21:04)  HR: 84 (05-28-25 @ 07:32) (81 - 109)  BP: 110/74 (05-28-25 @ 07:32) (110/74 - 125/82)  RR: 18 (05-28-25 @ 07:32) (18 - 20)  SpO2: 98% (05-28-25 @ 07:32) (98% - 100%)    CONSTITUTIONAL: no apparent distress but appears in pain  EYES: PERRLA and symmetric, EOMI, No conjunctival or scleral injection, non-icteric  ENMT: Oral mucosa with moist membranes. no pharyngeal injection or exudates  NECK: Supple, symmetric and without tracheal deviation   RESP: No respiratory distress, no use of accessory muscles; CTA b/l, no WRR  CV: RRR, +S1S2, no MRG; no JVD; no peripheral edema  GI: Soft, NT, ND, no rebound, no guarding;   MSK: Cannot assess gait. Lmiti ROM of Left LE due to pain. tenseness over left buttock and left LE.  NEURO:AOx3, no focal deficits

## 2025-05-29 LAB
ANION GAP SERPL CALC-SCNC: 16 MMOL/L — HIGH (ref 7–14)
BASOPHILS # BLD AUTO: 0.05 K/UL — SIGNIFICANT CHANGE UP (ref 0–0.2)
BASOPHILS NFR BLD AUTO: 0.4 % — SIGNIFICANT CHANGE UP (ref 0–1)
BUN SERPL-MCNC: 26 MG/DL — HIGH (ref 10–20)
CALCIUM SERPL-MCNC: 10.5 MG/DL — SIGNIFICANT CHANGE UP (ref 8.4–10.5)
CHLORIDE SERPL-SCNC: 97 MMOL/L — LOW (ref 98–110)
CO2 SERPL-SCNC: 27 MMOL/L — SIGNIFICANT CHANGE UP (ref 17–32)
CREAT SERPL-MCNC: 1.2 MG/DL — SIGNIFICANT CHANGE UP (ref 0.7–1.5)
EGFR: 54 ML/MIN/1.73M2 — LOW
EGFR: 54 ML/MIN/1.73M2 — LOW
EOSINOPHIL # BLD AUTO: 0.3 K/UL — SIGNIFICANT CHANGE UP (ref 0–0.7)
EOSINOPHIL NFR BLD AUTO: 2.6 % — SIGNIFICANT CHANGE UP (ref 0–8)
GLUCOSE BLDC GLUCOMTR-MCNC: 184 MG/DL — HIGH (ref 70–99)
GLUCOSE BLDC GLUCOMTR-MCNC: 260 MG/DL — HIGH (ref 70–99)
GLUCOSE SERPL-MCNC: 116 MG/DL — HIGH (ref 70–99)
HCT VFR BLD CALC: 41.5 % — SIGNIFICANT CHANGE UP (ref 37–47)
HGB BLD-MCNC: 13.6 G/DL — SIGNIFICANT CHANGE UP (ref 12–16)
IMM GRANULOCYTES NFR BLD AUTO: 0.3 % — SIGNIFICANT CHANGE UP (ref 0.1–0.3)
LYMPHOCYTES # BLD AUTO: 38.9 % — SIGNIFICANT CHANGE UP (ref 20.5–51.1)
LYMPHOCYTES # BLD AUTO: 4.55 K/UL — HIGH (ref 1.2–3.4)
MCHC RBC-ENTMCNC: 29.3 PG — SIGNIFICANT CHANGE UP (ref 27–31)
MCHC RBC-ENTMCNC: 32.8 G/DL — SIGNIFICANT CHANGE UP (ref 32–37)
MCV RBC AUTO: 89.4 FL — SIGNIFICANT CHANGE UP (ref 81–99)
MONOCYTES # BLD AUTO: 0.74 K/UL — HIGH (ref 0.1–0.6)
MONOCYTES NFR BLD AUTO: 6.3 % — SIGNIFICANT CHANGE UP (ref 1.7–9.3)
NEUTROPHILS # BLD AUTO: 6.01 K/UL — SIGNIFICANT CHANGE UP (ref 1.4–6.5)
NEUTROPHILS NFR BLD AUTO: 51.5 % — SIGNIFICANT CHANGE UP (ref 42.2–75.2)
NRBC BLD AUTO-RTO: 0 /100 WBCS — SIGNIFICANT CHANGE UP (ref 0–0)
PLATELET # BLD AUTO: 375 K/UL — SIGNIFICANT CHANGE UP (ref 130–400)
PMV BLD: 10.2 FL — SIGNIFICANT CHANGE UP (ref 7.4–10.4)
POTASSIUM SERPL-MCNC: 4.9 MMOL/L — SIGNIFICANT CHANGE UP (ref 3.5–5)
POTASSIUM SERPL-SCNC: 4.9 MMOL/L — SIGNIFICANT CHANGE UP (ref 3.5–5)
RBC # BLD: 4.64 M/UL — SIGNIFICANT CHANGE UP (ref 4.2–5.4)
RBC # FLD: 13.5 % — SIGNIFICANT CHANGE UP (ref 11.5–14.5)
SODIUM SERPL-SCNC: 140 MMOL/L — SIGNIFICANT CHANGE UP (ref 135–146)
WBC # BLD: 11.69 K/UL — HIGH (ref 4.8–10.8)
WBC # FLD AUTO: 11.69 K/UL — HIGH (ref 4.8–10.8)

## 2025-05-29 PROCEDURE — 99232 SBSQ HOSP IP/OBS MODERATE 35: CPT

## 2025-05-29 PROCEDURE — 99222 1ST HOSP IP/OBS MODERATE 55: CPT | Mod: 25

## 2025-05-29 RX ORDER — POLYETHYLENE GLYCOL 3350 17 G/17G
17 POWDER, FOR SOLUTION ORAL
Refills: 0 | Status: DISCONTINUED | OUTPATIENT
Start: 2025-05-29 | End: 2025-06-04

## 2025-05-29 RX ORDER — GABAPENTIN 400 MG/1
600 CAPSULE ORAL AT BEDTIME
Refills: 0 | Status: DISCONTINUED | OUTPATIENT
Start: 2025-05-29 | End: 2025-06-04

## 2025-05-29 RX ADMIN — TIZANIDINE 4 MILLIGRAM(S): 4 TABLET ORAL at 17:35

## 2025-05-29 RX ADMIN — LIDOCAINE HYDROCHLORIDE 1 PATCH: 20 JELLY TOPICAL at 02:20

## 2025-05-29 RX ADMIN — GABAPENTIN 300 MILLIGRAM(S): 400 CAPSULE ORAL at 12:55

## 2025-05-29 RX ADMIN — PREDNISONE 80 MILLIGRAM(S): 20 TABLET ORAL at 05:29

## 2025-05-29 RX ADMIN — OLANZAPINE 5 MILLIGRAM(S): 10 TABLET ORAL at 12:55

## 2025-05-29 RX ADMIN — OXYCODONE HYDROCHLORIDE 5 MILLIGRAM(S): 30 TABLET ORAL at 17:38

## 2025-05-29 RX ADMIN — POLYETHYLENE GLYCOL 3350 17 GRAM(S): 17 POWDER, FOR SOLUTION ORAL at 17:35

## 2025-05-29 RX ADMIN — DULOXETINE 20 MILLIGRAM(S): 20 CAPSULE, DELAYED RELEASE ORAL at 21:38

## 2025-05-29 RX ADMIN — Medication 4 MILLIGRAM(S): at 00:07

## 2025-05-29 RX ADMIN — Medication 4 MILLIGRAM(S): at 06:19

## 2025-05-29 RX ADMIN — TIZANIDINE 4 MILLIGRAM(S): 4 TABLET ORAL at 05:30

## 2025-05-29 RX ADMIN — KETOROLAC TROMETHAMINE 15 MILLIGRAM(S): 30 INJECTION, SOLUTION INTRAMUSCULAR; INTRAVENOUS at 21:38

## 2025-05-29 RX ADMIN — KETOROLAC TROMETHAMINE 15 MILLIGRAM(S): 30 INJECTION, SOLUTION INTRAMUSCULAR; INTRAVENOUS at 00:06

## 2025-05-29 RX ADMIN — KETOROLAC TROMETHAMINE 15 MILLIGRAM(S): 30 INJECTION, SOLUTION INTRAMUSCULAR; INTRAVENOUS at 06:19

## 2025-05-29 RX ADMIN — ATORVASTATIN CALCIUM 40 MILLIGRAM(S): 80 TABLET, FILM COATED ORAL at 21:38

## 2025-05-29 RX ADMIN — Medication 2 TABLET(S): at 21:38

## 2025-05-29 RX ADMIN — GABAPENTIN 600 MILLIGRAM(S): 400 CAPSULE ORAL at 21:38

## 2025-05-29 RX ADMIN — DULOXETINE 60 MILLIGRAM(S): 20 CAPSULE, DELAYED RELEASE ORAL at 12:55

## 2025-05-29 RX ADMIN — LIDOCAINE HYDROCHLORIDE 1 PATCH: 20 JELLY TOPICAL at 05:31

## 2025-05-29 RX ADMIN — Medication 40 MILLIGRAM(S): at 05:30

## 2025-05-29 RX ADMIN — TIZANIDINE 4 MILLIGRAM(S): 4 TABLET ORAL at 12:55

## 2025-05-29 RX ADMIN — LISINOPRIL 40 MILLIGRAM(S): 5 TABLET ORAL at 05:30

## 2025-05-29 RX ADMIN — Medication 975 MILLIGRAM(S): at 09:39

## 2025-05-29 RX ADMIN — GABAPENTIN 300 MILLIGRAM(S): 400 CAPSULE ORAL at 05:29

## 2025-05-29 RX ADMIN — ENOXAPARIN SODIUM 40 MILLIGRAM(S): 100 INJECTION SUBCUTANEOUS at 21:38

## 2025-05-29 NOTE — PROGRESS NOTE ADULT - ASSESSMENT
#left Sciatica   #severe left leg pain , without neuromuscular deficit on exam   - Pain management consulted  - Pt took prednisolone in the past that improved her pain but pain returned shortly after stopping meds  - Had steroid injection 3 yrs ago that helped and allowed her to participate in physical therapy  - Walks with cane but with difficulty and pain  - She is on maximal pain therapy except for opiates at home  - start prednisone 80mg qd  - morphine 4mg q4 PRN for severe pain  - c/w gabapentin 300mg in AM, 300mg at noon  and 600mg at bedtime  - c/w toradol 15 q6hr  - c/w duloxetine 60mg in AM and 20mg ion PM  - MRI spine planned as outpatient  if EMG and nerve conduction studies are abnormal (scheduled for this upcoming Tuesday with her physician)   - PT/OT  - Pt having hallucinations this AM. likely 2/2 steroids. she understands and doesnt want to decrease steroid dose. monitor.     #constipation  - start stool softners   - last BM 2 days ago     #syncope  #Vertigo  - unlikely syncopal episode  - may be related to the vomit/nausea  - d/c telemtry   - Meclizine PRN    #HTN  - c/w lisinoprile and HCTZ    #Depression  #PTSD  #Fibromuyalgia  - c/w olanzapine 5mg qd  - c/w duloxetine 60 in am and 20 in PM    #DL  -  c/w atorvastatin    #Dispo Med/surg floor   #left Sciatica   #severe left leg pain , without neuromuscular deficit on exam   - Pain management consulted  - Pt took prednisolone in the past that improved her pain but pain returned shortly after stopping meds  - Had steroid injection 3 yrs ago that helped and allowed her to participate in physical therapy  - Walks with cane but with difficulty and pain  - She is on maximal pain therapy except for opiates at home  - start prednisone 80mg qd  - morphine 4mg q4 PRN for severe pain  - c/w gabapentin 300mg in AM, 300mg at noon  and 600mg at bedtime  - c/w toradol 15 q6hr  - c/w duloxetine 60mg in AM and 20mg ion PM  - MRI spine planned as outpatient  if EMG and nerve conduction studies are abnormal (scheduled for this upcoming Tuesday with her physician)   - PT/OT  - Pt endorsed transient hallucinations this AM. likely 2/2 steroids. she understands and doesnt want to decrease steroid dose. monitor.     #constipation  - start stool softners   - last BM 2 days ago     #syncope  #Vertigo  - unlikely syncopal episode  - may be related to the vomit/nausea  - d/c telemtry   - Meclizine PRN    #HTN  - c/w lisinoprile and HCTZ    #Depression  #PTSD  #Fibromualgia  - c/w olanzapine 5mg qd  - c/w duloxetine 60 in am and 20 in PM    #DL  -  c/w atorvastatin    #Dispo Med/surg floor

## 2025-05-29 NOTE — CONSULT NOTE ADULT - NS ATTEST RISK PROBLEM GEN_ALL_CORE FT
This 54-year-old female with a past medical history of hypertension, hyperlipidemia, diabetes, degenerative disc disease, herniated discs, fibromyalgia, and PTSD presents to the ED with an exacerbation of left-sided sciatica, experiencing severe left lower back/buttock pain radiating down the posterior aspect of her left leg. Her chronic pain conditions, diabetes, and current severe pain episode place her at moderate risk.

## 2025-05-29 NOTE — PHYSICAL THERAPY INITIAL EVALUATION ADULT - ADDITIONAL COMMENTS
Patient lives in apartment building with elevators that occasionally breaks. Was independent in ADLs and ambulation using straight cane.

## 2025-05-29 NOTE — PROGRESS NOTE ADULT - ATTENDING COMMENTS
54-year-old female PMH hypertension, hyperlipidemia, diabetes, degenerative disc disease, herniated disks, fibromyalgia, PTSD, left sciatica coming to ED for severe left lower back/buttock pain radiating down her posterior aspect of her left leg.     # acute exacerbation of chronica sciatical pain of left LE  - sx ongoing since mid-March 2025, was initially given short 6-7 day course of prednisolone which she finished, but her symptoms re-exacerbated 2 weeks ago when she was climbing stairs with groceries  - patient benefited in the past from locally delivered Toradol and steroidal injections, according to her, no longer covered by her insurance   - on NSAIDs, Peewee 300/300/600 , and muscle relaxants at baseline   - current worsening of pain likely due to recent stressor related to malfunction of elevator at patient's residence, leading to stair climbing   - Suspected transient syncopal episode without sequelae, likely related to vasovagal event as patient described acute episode of nausea and stomach pain preceding the event. No evidence of concerning g arrhythmias on ekg or cardiac monitoring   - rest of active issues as per detailed note above     PLAN   - 5-7 day course of oral steroids   - intravenous toradol then oral steroids   - pain management eval   - PT eval (patient uses cane at baseline, describes unsteadiness at times)   - No indication for MRI as inpatient at this time, patient is confirming that she is scheduled for EMG and NCS as outpatient for upcoming Tuesday; further neuro w/u (including imaging if needed) based on findings.   - no further cardiac w/u warranted as inpatient at this time     anticipate 24-48 hours dc based on patient's clinical symptoms 54-year-old female PMH hypertension, hyperlipidemia, diabetes, degenerative disc disease, herniated disks, fibromyalgia, PTSD, left sciatica coming to ED for severe left lower back/buttock pain radiating down her posterior aspect of her left leg.     # acute exacerbation of chronica sciatical pain of left LE  - sx ongoing since mid-March 2025, was initially given short 6-7 day course of prednisolone which she finished, but her symptoms re-exacerbated 2 weeks ago when she was climbing stairs with groceries  - patient benefited in the past from locally delivered Toradol and steroidal injections, according to her, no longer covered by her insurance   - on NSAIDs, Peewee 300/300/600 , and muscle relaxants at baseline   - current worsening of pain likely due to recent stressor related to malfunction of elevator at patient's residence, leading to stair climbing   - Suspected transient syncopal episode without sequelae, likely related to vasovagal event as patient described acute episode of nausea and stomach pain preceding the event. No evidence of concerning g arrhythmias on ekg or cardiac monitoring   - rest of active issues as per detailed note above     PLAN   - 5-7 day course of oral steroids   - intravenous toradol/morphine prn -> required overnight and this AM. monitor off IV meds for next 24 hours   - pain management eval   - PT eval (patient uses cane at baseline, describes unsteadiness at times)   - No indication for MRI as inpatient at this time, patient is confirming that she is scheduled for EMG and NCS as outpatient for upcoming Tuesday; further neuro w/u (including imaging if needed) based on findings.   - no further cardiac w/u warranted as inpatient at this time     anticipate 24-48 hours dc based on patient's clinical symptoms

## 2025-05-30 LAB
ANION GAP SERPL CALC-SCNC: 14 MMOL/L — SIGNIFICANT CHANGE UP (ref 7–14)
APPEARANCE UR: CLEAR — SIGNIFICANT CHANGE UP
BASOPHILS # BLD AUTO: 0.01 K/UL — SIGNIFICANT CHANGE UP (ref 0–0.2)
BASOPHILS NFR BLD AUTO: 0.1 % — SIGNIFICANT CHANGE UP (ref 0–1)
BILIRUB UR-MCNC: NEGATIVE — SIGNIFICANT CHANGE UP
BUN SERPL-MCNC: 42 MG/DL — HIGH (ref 10–20)
CALCIUM SERPL-MCNC: 9.5 MG/DL — SIGNIFICANT CHANGE UP (ref 8.4–10.5)
CHLORIDE SERPL-SCNC: 95 MMOL/L — LOW (ref 98–110)
CO2 SERPL-SCNC: 24 MMOL/L — SIGNIFICANT CHANGE UP (ref 17–32)
COLOR SPEC: YELLOW — SIGNIFICANT CHANGE UP
CREAT ?TM UR-MCNC: 150 MG/DL — SIGNIFICANT CHANGE UP
CREAT SERPL-MCNC: 2.5 MG/DL — HIGH (ref 0.7–1.5)
DIFF PNL FLD: NEGATIVE — SIGNIFICANT CHANGE UP
EGFR: 22 ML/MIN/1.73M2 — LOW
EGFR: 22 ML/MIN/1.73M2 — LOW
EOSINOPHIL # BLD AUTO: 0.05 K/UL — SIGNIFICANT CHANGE UP (ref 0–0.7)
EOSINOPHIL NFR BLD AUTO: 0.4 % — SIGNIFICANT CHANGE UP (ref 0–8)
GLUCOSE BLDC GLUCOMTR-MCNC: 141 MG/DL — HIGH (ref 70–99)
GLUCOSE BLDC GLUCOMTR-MCNC: 214 MG/DL — HIGH (ref 70–99)
GLUCOSE BLDC GLUCOMTR-MCNC: 249 MG/DL — HIGH (ref 70–99)
GLUCOSE SERPL-MCNC: 142 MG/DL — HIGH (ref 70–99)
GLUCOSE UR QL: NEGATIVE MG/DL — SIGNIFICANT CHANGE UP
HCT VFR BLD CALC: 34.4 % — LOW (ref 37–47)
HGB BLD-MCNC: 11.5 G/DL — LOW (ref 12–16)
IMM GRANULOCYTES NFR BLD AUTO: 0.3 % — SIGNIFICANT CHANGE UP (ref 0.1–0.3)
KETONES UR QL: ABNORMAL MG/DL
LEUKOCYTE ESTERASE UR-ACNC: ABNORMAL
LYMPHOCYTES # BLD AUTO: 28.2 % — SIGNIFICANT CHANGE UP (ref 20.5–51.1)
LYMPHOCYTES # BLD AUTO: 3.31 K/UL — SIGNIFICANT CHANGE UP (ref 1.2–3.4)
MCHC RBC-ENTMCNC: 29.6 PG — SIGNIFICANT CHANGE UP (ref 27–31)
MCHC RBC-ENTMCNC: 33.4 G/DL — SIGNIFICANT CHANGE UP (ref 32–37)
MCV RBC AUTO: 88.4 FL — SIGNIFICANT CHANGE UP (ref 81–99)
MONOCYTES # BLD AUTO: 0.96 K/UL — HIGH (ref 0.1–0.6)
MONOCYTES NFR BLD AUTO: 8.2 % — SIGNIFICANT CHANGE UP (ref 1.7–9.3)
NEUTROPHILS # BLD AUTO: 7.37 K/UL — HIGH (ref 1.4–6.5)
NEUTROPHILS NFR BLD AUTO: 62.8 % — SIGNIFICANT CHANGE UP (ref 42.2–75.2)
NITRITE UR-MCNC: NEGATIVE — SIGNIFICANT CHANGE UP
NRBC BLD AUTO-RTO: 0 /100 WBCS — SIGNIFICANT CHANGE UP (ref 0–0)
OSMOLALITY UR: 324 MOS/KG — SIGNIFICANT CHANGE UP (ref 50–1200)
PH UR: 5.5 — SIGNIFICANT CHANGE UP (ref 5–8)
PLATELET # BLD AUTO: 301 K/UL — SIGNIFICANT CHANGE UP (ref 130–400)
PMV BLD: 10.5 FL — HIGH (ref 7.4–10.4)
POTASSIUM SERPL-MCNC: 4.2 MMOL/L — SIGNIFICANT CHANGE UP (ref 3.5–5)
POTASSIUM SERPL-SCNC: 4.2 MMOL/L — SIGNIFICANT CHANGE UP (ref 3.5–5)
POTASSIUM UR-SCNC: 50 MMOL/L — SIGNIFICANT CHANGE UP
PROT ?TM UR-MCNC: 20 MG/DL — SIGNIFICANT CHANGE UP
PROT UR-MCNC: NEGATIVE MG/DL — SIGNIFICANT CHANGE UP
PROT/CREAT UR-RTO: 0.1 RATIO — SIGNIFICANT CHANGE UP (ref 0–0.2)
RBC # BLD: 3.89 M/UL — LOW (ref 4.2–5.4)
RBC # FLD: 13.5 % — SIGNIFICANT CHANGE UP (ref 11.5–14.5)
SODIUM SERPL-SCNC: 133 MMOL/L — LOW (ref 135–146)
SODIUM UR-SCNC: <20 MMOL/L — SIGNIFICANT CHANGE UP
SP GR SPEC: 1.01 — SIGNIFICANT CHANGE UP (ref 1–1.03)
UROBILINOGEN FLD QL: 0.2 MG/DL — SIGNIFICANT CHANGE UP (ref 0.2–1)
UUN UR-MCNC: 400 MG/DL — SIGNIFICANT CHANGE UP
WBC # BLD: 11.74 K/UL — HIGH (ref 4.8–10.8)
WBC # FLD AUTO: 11.74 K/UL — HIGH (ref 4.8–10.8)

## 2025-05-30 PROCEDURE — 99222 1ST HOSP IP/OBS MODERATE 55: CPT

## 2025-05-30 PROCEDURE — 76770 US EXAM ABDO BACK WALL COMP: CPT | Mod: 26

## 2025-05-30 PROCEDURE — 72148 MRI LUMBAR SPINE W/O DYE: CPT | Mod: 26

## 2025-05-30 PROCEDURE — 99233 SBSQ HOSP IP/OBS HIGH 50: CPT

## 2025-05-30 RX ORDER — SODIUM CHLORIDE 9 G/1000ML
1000 INJECTION, SOLUTION INTRAVENOUS ONCE
Refills: 0 | Status: COMPLETED | OUTPATIENT
Start: 2025-05-30 | End: 2025-05-30

## 2025-05-30 RX ADMIN — GABAPENTIN 600 MILLIGRAM(S): 400 CAPSULE ORAL at 21:50

## 2025-05-30 RX ADMIN — TIZANIDINE 4 MILLIGRAM(S): 4 TABLET ORAL at 23:15

## 2025-05-30 RX ADMIN — SODIUM CHLORIDE 1000 MILLILITER(S): 9 INJECTION, SOLUTION INTRAVENOUS at 09:23

## 2025-05-30 RX ADMIN — TIZANIDINE 4 MILLIGRAM(S): 4 TABLET ORAL at 00:23

## 2025-05-30 RX ADMIN — DULOXETINE 60 MILLIGRAM(S): 20 CAPSULE, DELAYED RELEASE ORAL at 11:41

## 2025-05-30 RX ADMIN — TIZANIDINE 4 MILLIGRAM(S): 4 TABLET ORAL at 05:22

## 2025-05-30 RX ADMIN — ATORVASTATIN CALCIUM 40 MILLIGRAM(S): 80 TABLET, FILM COATED ORAL at 21:48

## 2025-05-30 RX ADMIN — Medication 40 MILLIGRAM(S): at 05:22

## 2025-05-30 RX ADMIN — ENOXAPARIN SODIUM 40 MILLIGRAM(S): 100 INJECTION SUBCUTANEOUS at 21:49

## 2025-05-30 RX ADMIN — OXYCODONE HYDROCHLORIDE 5 MILLIGRAM(S): 30 TABLET ORAL at 11:39

## 2025-05-30 RX ADMIN — OXYCODONE HYDROCHLORIDE 5 MILLIGRAM(S): 30 TABLET ORAL at 12:38

## 2025-05-30 RX ADMIN — POLYETHYLENE GLYCOL 3350 17 GRAM(S): 17 POWDER, FOR SOLUTION ORAL at 17:13

## 2025-05-30 RX ADMIN — DULOXETINE 20 MILLIGRAM(S): 20 CAPSULE, DELAYED RELEASE ORAL at 21:48

## 2025-05-30 RX ADMIN — TIZANIDINE 4 MILLIGRAM(S): 4 TABLET ORAL at 17:12

## 2025-05-30 RX ADMIN — OXYCODONE HYDROCHLORIDE 5 MILLIGRAM(S): 30 TABLET ORAL at 23:16

## 2025-05-30 RX ADMIN — OLANZAPINE 5 MILLIGRAM(S): 10 TABLET ORAL at 11:39

## 2025-05-30 RX ADMIN — GABAPENTIN 300 MILLIGRAM(S): 400 CAPSULE ORAL at 11:42

## 2025-05-30 RX ADMIN — GABAPENTIN 300 MILLIGRAM(S): 400 CAPSULE ORAL at 05:21

## 2025-05-30 RX ADMIN — PREDNISONE 80 MILLIGRAM(S): 20 TABLET ORAL at 05:22

## 2025-05-30 RX ADMIN — Medication 2 TABLET(S): at 21:48

## 2025-05-30 RX ADMIN — TIZANIDINE 4 MILLIGRAM(S): 4 TABLET ORAL at 11:39

## 2025-05-30 NOTE — PROGRESS NOTE ADULT - ATTENDING COMMENTS
Renal failure  - stop nsaids  - can continue steroids and opiates for now  - ddx includes nsaid related vs UMBERTO vs hypotension   - check AST/ALT to se if any evidence of hepatocellular desrtruction     orthostatic hypotensions  - stop diuretics  - IL fluid bolus    sciatica  - steroid and opiates  - pt says pain ios improved  - PT

## 2025-05-30 NOTE — CONSULT NOTE ADULT - ATTENDING COMMENTS
Patient seen and examined with medical student in MRI suite.  Noted 55 yo F with h/o HTN, dyslipidemia, NIDDM, chronic LBP with DJD and herniated disks, admitted for weakness from home associated with copious diarrhea.  Patient also reports near syncope on admission.  Ill contact with daughter recently who is a schoolteacher in the [place].  Noted hypotension with home medications continued including RAAS blocker, diuretic and NSAIDS (switched to Toradol on admission).  Likely pre-renal component with renal hypoperfusion exacerbated by medication use, rule out ATN.  Diarrhea possibly from exposure to daughter, possible norovirus.  Awaiting repeat sCr after LR use this morning, and awaiting urine studies including UA and urine sodium/creatinine values, RBUS, and CPK and C3.  Encourage po hydration for now.  No acute indication for RRT.  Nephrology will follow.

## 2025-05-30 NOTE — CONSULT NOTE ADULT - CONSULT REASON
worsening chronic sciatica pain
Has REBECCA today, BPs soft, was on ketorolac. We are sending urine studies and getting US renal.

## 2025-05-30 NOTE — CONSULT NOTE ADULT - SUBJECTIVE AND OBJECTIVE BOX
HPI:  54-year-old female PMHx of hypertension, hyperlipidemia, diabetes, degenerative disc disease, herniated disks, fibromyalgia, PTSD, left scitaica coming to ED for severe left lower back/buttock pain radiating down her posterior aspect of her left leg. Nephrology was consulted for development of REBECCA with a creatinine increase from 0.9 to 2.5. Patient reports exacerbation of left-sided sciatica since mid March that worsened over the last week. She follows with pain management and has been taking steroids/gabapentin/Cymbalta/Excedrin with no relief. Patient reports that she had vomiting and diarrhea due to "stomach bug" 4 days ago that has now resolved and that she had a syncopal episode in the bathroom while trying to vomit but was able to break her fall by holding on to the toilet. She felt lightheaded and dizzy at the time.  Denies headache, head trauma, neck pain, chest pain, abdominal pain, no shortness of breath, nausea vomiting diarrhea constipation, unilateral numbness/weakness. Patient is able to void and endorses no pain with voiding.    ED/Hopsital course:  VS: 99.7F, 109 HR, 125/82 BP, 20 RR, 99% on room air  Given Toradol 15mg doses initially but was discontinued, started prednisone 80mg qd  Had hydrochlorothiazide, lisinopril due to soft blood pressure  Given 1L LR bolus  Placed on Dash diet  Pending: Urinalysis, urine osmolality sodium potassium urea, and protein/creatinine ratio    Home Medications:  diclofenac sodium 75 mg oral delayed release tablet: 1 tab(s) orally 2 times a day (28 May 2025 15:53)  DULoxetine 20 mg oral delayed release capsule: 1 cap(s) orally once a day (at bedtime) (28 May 2025 15:51)  DULoxetine 60 mg oral delayed release capsule: 1 cap(s) orally once a day (in the morning) (28 May 2025 15:51)  famotidine 40 mg oral tablet: 1 tab(s) orally once a day (at bedtime) (28 May 2025 15:53)  ferrous sulfate 325 mg (65 mg elemental iron) oral delayed release tablet: 1 tab(s) orally once a day (28 May 2025 15:53)  gabapentin 300 mg oral capsule: 1 cap(s) orally twice a day after breakfast and lunch (28 May 2025 15:51)  gabapentin 600 mg oral tablet: 1 tab(s) orally once a day (at bedtime) (28 May 2025 15:51)  hydroCHLOROthiazide 25 mg oral tablet: 1 tab(s) orally once a day (28 May 2025 15:52)  lisinopril 40 mg oral tablet: 1 tab(s) orally once a day (28 May 2025 15:53)  lovastatin 40 mg oral tablet, extended release: 1 tab(s) orally once a day (at bedtime) (28 May 2025 15:51)  Meclicot 25 mg oral tablet: 1 tab(s) orally 2 times a day (28 May 2025 15:51)  OLANZapine 5 mg oral tablet: 1 tab(s) orally once a day (28 May 2025 15:51)    MEDICATIONS  (STANDING):  atorvastatin 40 milliGRAM(s) Oral at bedtime  DULoxetine 20 milliGRAM(s) Oral at bedtime  DULoxetine 60 milliGRAM(s) Oral daily  enoxaparin Injectable 40 milliGRAM(s) SubCutaneous every 24 hours  gabapentin 600 milliGRAM(s) Oral at bedtime  gabapentin 300 milliGRAM(s) Oral <User Schedule>  OLANZapine 5 milliGRAM(s) Oral daily  pantoprazole    Tablet 40 milliGRAM(s) Oral before breakfast  polyethylene glycol 3350 17 Gram(s) Oral two times a day  predniSONE   Tablet 80 milliGRAM(s) Oral daily  senna 2 Tablet(s) Oral at bedtime  tiZANidine 4 milliGRAM(s) Oral every 6 hours    MEDICATIONS  (PRN):  acetaminophen     Tablet .. 975 milliGRAM(s) Oral every 6 hours PRN Mild Pain (1 - 3)  morphine  - Injectable 4 milliGRAM(s) IV Push every 4 hours PRN Severe Pain (7 - 10)  oxyCODONE    IR 5 milliGRAM(s) Oral four times a day PRN Moderate Pain (4 - 6)    Vital Signs Last 24 Hrs  T(C): 36.3 (30 May 2025 07:00), Max: 36.9 (29 May 2025 15:00)  T(F): 97.3 (30 May 2025 07:00), Max: 98.4 (29 May 2025 15:00)  HR: 77 (30 May 2025 07:00) (74 - 98)  BP: 95/56 (30 May 2025 07:00) (91/58 - 97/59)  BP(mean): --  RR: 18 (30 May 2025 00:17) (18 - 18)  SpO2: 97% (30 May 2025 07:00) (94% - 98%)  Patient On (Oxygen Delivery Method): room air    T(C): 36.3 (05-30-25 @ 07:00), Max: 36.9 (05-29-25 @ 15:00)  HR: 77 (05-30-25 @ 07:00) (74 - 98)  BP: 95/56 (05-30-25 @ 07:00) (91/58 - 97/59)  RR: 18 (05-30-25 @ 00:17) (18 - 18)  SpO2: 97% (05-30-25 @ 07:00) (94% - 98%)    CONSTITUTIONAL: Well groomed, no apparent distress, pleasant  RESP: No respiratory distress, normal breath sounds appreciated   CV: normal S1S2, no peripheral edema  GI: Soft, nontender  SKIN: No rashes or lesions noted  PSYCH: Appropriate insight/judgment; A+O x 3    Labs:  Creatinine: 0.9->1.2-> 2.5 (Baseline 0.7-0.8)          Bun: 20 -> 26 -> 42                 11.5   11.74 )-----------( 301      ( 30 May 2025 06:09 )             34.4   05-30    133[L]  |  95[L]  |  42[H]  ----------------------------<  142[H]  4.2   |  24  |  2.5[H]    Ca    9.5      30 May 2025 06:09    Urinalysis Basic - ( 30 May 2025 06:09 )  Color: x / Appearance: x / SG: x / pH: x  Gluc: 142 mg/dL / Ketone: x  / Bili: x / Urobili: x   Blood: x / Protein: x / Nitrite: x   Leuk Esterase: x / RBC: x / WBC x   Sq Epi: x / Non Sq Epi: x / Bacteria: x    Venous Blood Gas:  Ph 7.4, pCO2 42, HCO3 26    Pending: Urinalysis, urine osmolality sodium potassium urea, and protein/creatinine ratio    Pending: US Renal
HPI: Patient is a 54F with PMH of hypertension, hyperlipidemia, diabetes, degenerative disc disease, herniated disks, fibromyalgia, PTSD, left scitaica coming to ED for severe left lower back/buttock pain radiating down her posterior aspect of her left leg. Patient reports exacerbation of left-sided sciatica since mid March that worsened over the last week. She follows with pain management and has been taking steroids/gabapentin/Cymbalta/Excedrin with no relief.    Current Inpatient Medication Regimen:  acetaminophen     Tablet .. 975 milliGRAM(s) Oral every 6 hours PRN  atorvastatin 40 milliGRAM(s) Oral at bedtime  DULoxetine 20 milliGRAM(s) Oral at bedtime  DULoxetine 60 milliGRAM(s) Oral daily  enoxaparin Injectable 40 milliGRAM(s) SubCutaneous every 24 hours  gabapentin 600 milliGRAM(s) Oral at bedtime  gabapentin 300 milliGRAM(s) Oral <User Schedule>  hydrochlorothiazide 25 milliGRAM(s) Oral daily  ketorolac   Injectable 15 milliGRAM(s) IV Push every 6 hours PRN  lisinopril 40 milliGRAM(s) Oral daily  morphine  - Injectable 4 milliGRAM(s) IV Push every 4 hours PRN  OLANZapine 5 milliGRAM(s) Oral daily  oxyCODONE    IR 5 milliGRAM(s) Oral four times a day PRN  pantoprazole    Tablet 40 milliGRAM(s) Oral before breakfast  polyethylene glycol 3350 17 Gram(s) Oral two times a day  predniSONE   Tablet 80 milliGRAM(s) Oral daily  senna 2 Tablet(s) Oral at bedtime  tiZANidine 4 milliGRAM(s) Oral every 6 hours      Home Analgesic Regimen:      Allergies:  penicillin (Anaphylaxis)      Past Medical History:  Syncope and collapse    Radiculopathy, lumbar region-M54.16    Family history of acute myocardial infarction (Mother)    Family history of CHF (congestive heart failure) (Sibling)    Family history of early CAD (Aunt)    Handoff    MEWS Score    HTN (hypertension)    Fibromyalgia    PTSD (post-traumatic stress disorder)    Autoimmune hepatitis    DJD (degenerative joint disease)    Chronic lower back pain    HLD (hyperlipidemia)    Anxiety    Depression    Obesity    Chronic neck and back pain    Uveitis    History of endometrial hyperplasia    H/O sciatica    Syncope    History of liver biopsy    H/O  section    History of dilatation and curettage    L SIDE PAIN    Back pain        Review of Systems:  General: no fevers or chills  Eyes: no diplopia or blurred vision  ENT: no rhinorrhea  CV: no chest pain  Resp: no cough or dyspnea  GI: no abdominal pain, constipation, or diarrhea  : no urinary incontinence or dysuria  Neuro: no focal weakness or numbness     Physical Exam:  T(C): 36.8 (25 @ 00:06), Max: 37.1 (25 @ 20:42)  HR: 85 (25 @ 05:31) (85 - 115)  BP: 121/71 (25 @ 05:31) (100/66 - 132/90)  RR: 18 (25 @ 00:06) (18 - 18)  SpO2: 99% (25 @ 00:06) (95% - 99%)  Gen: NAD  Eyes: no glasses or scleral icterus  Head: Normocephalic / Atraumatic  CV: no JVD  Lungs: nonlabored breathing  Abdomen: nondistended, soft  : no camilo catheter in place  Back: tenderness to palpation  Neuro: AOx3  Extremities: full ROM in upper/lower extremities  Psych: normal affect      Labs:  CBC  11.69 K/uL[H] [4.80 - 10.80] > 13.6 g/dL [12.0 - 16.0] / 41.5 % [37.0 - 47.0] < 375 K/uL [130 - 400]      BMP  140 mmol/L [135 - 146] | 97 mmol/L[L] [98 - 110] | 26 mg/dL[H] [10 - 20]  4.9 mmol/L [3.5 - 5.0] | 27 mmol/L [17 - 32] | 1.2 mg/dL [0.7 - 1.5]    116 mg/dL[H] [70 - 99]        Imaging Studies:        Opioid Risk Assessment Tool                                                                           Female       Male  Family History  Alcohol                                                              1                3  Illegal drugs                                                       2                3  Rx drugs                                                            4                4    Personal History   Alcohol                                                              3                3  Illegal drugs                                                       4                4  Rx drugs                                                            5                5    Age between 16—45 years                                1                1  History of preadolescent sexual abuse               3                0    Psychological disease  ADD, OCD, bipolar, schizophrenia                      2                2  Depression                                                       1                1    Total Score                                                      0            __    0 - 3 = low risk for future opioid abuse  4 - 7 = moderate risk for future opioid abuse  8+ = high risk for future opioid abuse

## 2025-05-30 NOTE — PROGRESS NOTE ADULT - ASSESSMENT
54-year-old female PMH hypertension, hyperlipidemia, diabetes, degenerative disc disease, herniated disks, fibromyalgia, PTSD, left scitaica coming to ED for severe left lower back/buttock pain radiating down her posterior aspect of her left leg. Patient reports exacerbation of left-sided sciatica since mid March that worsened over the last week. She follows with pain management and has been taking steroids/gabapentin/Cymbalta/Excedrin with no relief.  Patient reports that she had vomiting and diarrhea due to "stomach bug" 4 days ago that has now resolved and that she had a syncopal? episode in the bathroom while trying to vomit but was able to break her fall by holding on to the toilet. She felt lightheaded and dizzy at the time.  Denies headache, head trauma, neck pain, chest pain, abdominal pain, no shortness of breath, nausea vomiting diarrhea constipation, unilateral numbness/weakness    #left Sciatica   #severe left leg pain , without neuromuscular deficit on exam   - Pt took prednisolone in the past that improved her pain but pain returned shortly after stopping meds  - Had steroid injection 3 yrs ago that helped and allowed her to participate in physical therapy  - Walks with cane but with difficulty and pain  - started prednisone 80mg qd  - morphine 4mg q4 PRN for severe pain  - c/w gabapentin 300mg in AM, 300mg at noon  and 600mg at bedtime  - DC TORADOL, AVOID NSAIDS   - c/w duloxetine 60mg in AM and 20mg in PM  - Pain management c/s: MRI spine ordered, safety checklist complete     #orthostatic hypotension   #syncope  #Vertigo  - BP continues to have dizzy spells, BP soft with + orthostatics   - give IVF   - BP meds dc'ed     #REBECCA  - possibly 2/2 to hypovolemia or NSAIDs  - IVF  - nephro c/s  - RBUS, UA with microscopic add on     #HTN  - hold BP meds     #Depression  #PTSD  #Fibromualgia  - c/w olanzapine 5mg qd  - c/w duloxetine 60 in am and 20 in PM    #DL  -  c/w atorvastatin    #Dispo Med/surg floor

## 2025-05-30 NOTE — CONSULT NOTE ADULT - ASSESSMENT
A/P:  Patient is a 54F with PMH of hypertension, hyperlipidemia, diabetes, degenerative disc disease, herniated disks, fibromyalgia, PTSD, left scitaica coming to ED for severe left lower back/buttock pain radiating down her posterior aspect of her left leg. Patient reports exacerbation of left-sided sciatica since mid March that worsened over the last week. She follows with pain management and has been taking steroids/gabapentin/Cymbalta/Excedrin with no relief.    Patient evaluated at bedside, complaining of worsening left lower back pain despite current regimen. Patient follows outpatient pain management - Dr. Tony and was started on a steroid pack, pending EMG and possible MRI. Patient says she had an epidural 3 years ago with moderate relief and participated in PT. She has also not had a recent MRI in past 3-4 years.     PLAN  - Please obtain MRI L spine w/o contrast  - Pending MRI result will consider possible Interventional pain injection  - Continue Multimodal regimen as ordered  - Plan discussed with patient   - Will continue to follow
54-year-old female PMHx of hypertension, hyperlipidemia, diabetes, degenerative disc disease, herniated disks, fibromyalgia, PTSD, left scitaica coming to ED for severe left lower back/buttock pain radiating down her posterior aspect of her left leg. Nephrology was consulted for development of REBECCA with a creatinine increase from 0.9 to 2.5. Patient is able to void and endorses no pain with voiding. Given that the patient was hydrated with 1L LR and discontinued on her lisinopril, hydrochlorothiazide, toradol upon her rising creatinine levels, a repeat BMP should be done to trend the creatinine to see if labs have improved, indicating a likely pre-renal hypovolemia REBECCA. In addition, given her chronic leg and back pain, with a history of herniated discs, an infectious etiology should also be ruled out.      #Pre-renal REBECCA 2/2 to medication vs infectious etiology  -Repeat BMP and trend Cr since recently received 1L LR bolus today  -c/w holding torodol and blood pressure medications  -Order complement c3 lab to rule out infectious process  -Strictly monitor I/O's to access kidney function  -Pending: Urinalysis, urine osmolality sodium potassium urea, and protein/creatinine ratio  -Pending: RBUS  -MRI spine can help to rule out osteomyelitis etiology for REBECCA

## 2025-05-31 LAB
ANION GAP SERPL CALC-SCNC: 12 MMOL/L — SIGNIFICANT CHANGE UP (ref 7–14)
APTT BLD: 26 SEC — LOW (ref 27–39.2)
BASOPHILS # BLD AUTO: 0.02 K/UL — SIGNIFICANT CHANGE UP (ref 0–0.2)
BASOPHILS NFR BLD AUTO: 0.2 % — SIGNIFICANT CHANGE UP (ref 0–1)
BUN SERPL-MCNC: 56 MG/DL — HIGH (ref 10–20)
CALCIUM SERPL-MCNC: 9.2 MG/DL — SIGNIFICANT CHANGE UP (ref 8.4–10.5)
CHLORIDE SERPL-SCNC: 97 MMOL/L — LOW (ref 98–110)
CK SERPL-CCNC: 83 U/L — SIGNIFICANT CHANGE UP (ref 0–225)
CO2 SERPL-SCNC: 24 MMOL/L — SIGNIFICANT CHANGE UP (ref 17–32)
CREAT SERPL-MCNC: 1.5 MG/DL — SIGNIFICANT CHANGE UP (ref 0.7–1.5)
EGFR: 41 ML/MIN/1.73M2 — LOW
EGFR: 41 ML/MIN/1.73M2 — LOW
EOSINOPHIL # BLD AUTO: 0.02 K/UL — SIGNIFICANT CHANGE UP (ref 0–0.7)
EOSINOPHIL NFR BLD AUTO: 0.2 % — SIGNIFICANT CHANGE UP (ref 0–8)
GLUCOSE BLDC GLUCOMTR-MCNC: 162 MG/DL — HIGH (ref 70–99)
GLUCOSE BLDC GLUCOMTR-MCNC: 211 MG/DL — HIGH (ref 70–99)
GLUCOSE SERPL-MCNC: 131 MG/DL — HIGH (ref 70–99)
HCT VFR BLD CALC: 33.2 % — LOW (ref 37–47)
HGB BLD-MCNC: 11 G/DL — LOW (ref 12–16)
IMM GRANULOCYTES NFR BLD AUTO: 0.5 % — HIGH (ref 0.1–0.3)
INR BLD: 1.02 RATIO — SIGNIFICANT CHANGE UP (ref 0.65–1.3)
LYMPHOCYTES # BLD AUTO: 28.9 % — SIGNIFICANT CHANGE UP (ref 20.5–51.1)
LYMPHOCYTES # BLD AUTO: 3.3 K/UL — SIGNIFICANT CHANGE UP (ref 1.2–3.4)
MCHC RBC-ENTMCNC: 29.4 PG — SIGNIFICANT CHANGE UP (ref 27–31)
MCHC RBC-ENTMCNC: 33.1 G/DL — SIGNIFICANT CHANGE UP (ref 32–37)
MCV RBC AUTO: 88.8 FL — SIGNIFICANT CHANGE UP (ref 81–99)
MONOCYTES # BLD AUTO: 0.76 K/UL — HIGH (ref 0.1–0.6)
MONOCYTES NFR BLD AUTO: 6.7 % — SIGNIFICANT CHANGE UP (ref 1.7–9.3)
NEUTROPHILS # BLD AUTO: 7.24 K/UL — HIGH (ref 1.4–6.5)
NEUTROPHILS NFR BLD AUTO: 63.5 % — SIGNIFICANT CHANGE UP (ref 42.2–75.2)
NRBC BLD AUTO-RTO: 0 /100 WBCS — SIGNIFICANT CHANGE UP (ref 0–0)
PLATELET # BLD AUTO: 294 K/UL — SIGNIFICANT CHANGE UP (ref 130–400)
PMV BLD: 10.5 FL — HIGH (ref 7.4–10.4)
POTASSIUM SERPL-MCNC: 4.7 MMOL/L — SIGNIFICANT CHANGE UP (ref 3.5–5)
POTASSIUM SERPL-SCNC: 4.7 MMOL/L — SIGNIFICANT CHANGE UP (ref 3.5–5)
PROTHROM AB SERPL-ACNC: 12 SEC — SIGNIFICANT CHANGE UP (ref 9.95–12.87)
RBC # BLD: 3.74 M/UL — LOW (ref 4.2–5.4)
RBC # FLD: 13.3 % — SIGNIFICANT CHANGE UP (ref 11.5–14.5)
SODIUM SERPL-SCNC: 133 MMOL/L — LOW (ref 135–146)
WBC # BLD: 11.4 K/UL — HIGH (ref 4.8–10.8)
WBC # FLD AUTO: 11.4 K/UL — HIGH (ref 4.8–10.8)

## 2025-05-31 PROCEDURE — 99232 SBSQ HOSP IP/OBS MODERATE 35: CPT | Mod: GC

## 2025-05-31 RX ADMIN — GABAPENTIN 600 MILLIGRAM(S): 400 CAPSULE ORAL at 21:12

## 2025-05-31 RX ADMIN — Medication 40 MILLIGRAM(S): at 05:14

## 2025-05-31 RX ADMIN — OLANZAPINE 5 MILLIGRAM(S): 10 TABLET ORAL at 11:54

## 2025-05-31 RX ADMIN — GABAPENTIN 300 MILLIGRAM(S): 400 CAPSULE ORAL at 11:55

## 2025-05-31 RX ADMIN — DULOXETINE 60 MILLIGRAM(S): 20 CAPSULE, DELAYED RELEASE ORAL at 12:43

## 2025-05-31 RX ADMIN — OXYCODONE HYDROCHLORIDE 5 MILLIGRAM(S): 30 TABLET ORAL at 10:40

## 2025-05-31 RX ADMIN — GABAPENTIN 300 MILLIGRAM(S): 400 CAPSULE ORAL at 05:09

## 2025-05-31 RX ADMIN — TIZANIDINE 4 MILLIGRAM(S): 4 TABLET ORAL at 11:54

## 2025-05-31 RX ADMIN — Medication 2 TABLET(S): at 22:48

## 2025-05-31 RX ADMIN — OXYCODONE HYDROCHLORIDE 5 MILLIGRAM(S): 30 TABLET ORAL at 09:56

## 2025-05-31 RX ADMIN — TIZANIDINE 4 MILLIGRAM(S): 4 TABLET ORAL at 05:10

## 2025-05-31 RX ADMIN — OXYCODONE HYDROCHLORIDE 5 MILLIGRAM(S): 30 TABLET ORAL at 06:20

## 2025-05-31 RX ADMIN — Medication 975 MILLIGRAM(S): at 16:31

## 2025-05-31 RX ADMIN — OXYCODONE HYDROCHLORIDE 5 MILLIGRAM(S): 30 TABLET ORAL at 21:12

## 2025-05-31 RX ADMIN — Medication 975 MILLIGRAM(S): at 06:20

## 2025-05-31 RX ADMIN — TIZANIDINE 4 MILLIGRAM(S): 4 TABLET ORAL at 17:35

## 2025-05-31 RX ADMIN — ATORVASTATIN CALCIUM 40 MILLIGRAM(S): 80 TABLET, FILM COATED ORAL at 21:13

## 2025-05-31 RX ADMIN — Medication 975 MILLIGRAM(S): at 17:17

## 2025-05-31 RX ADMIN — ENOXAPARIN SODIUM 40 MILLIGRAM(S): 100 INJECTION SUBCUTANEOUS at 21:14

## 2025-05-31 RX ADMIN — Medication 975 MILLIGRAM(S): at 05:11

## 2025-05-31 RX ADMIN — DULOXETINE 20 MILLIGRAM(S): 20 CAPSULE, DELAYED RELEASE ORAL at 21:12

## 2025-05-31 RX ADMIN — PREDNISONE 80 MILLIGRAM(S): 20 TABLET ORAL at 05:10

## 2025-05-31 NOTE — PROGRESS NOTE ADULT - ATTENDING COMMENTS
agree with A&P- note reviewed and updated  plan of care d.w patient  continue prednisone- higher dose; will taper tomorow  PT to follow  monitor BMP

## 2025-05-31 NOTE — PROGRESS NOTE ADULT - ASSESSMENT
53 yo F with h/o HTN, dyslipidemia, NIDDM, chronic LBP with DJD and herniated disks, admitted for weakness from home associated with copious diarrhea.  Patient also reports near syncope on admission  #Pre-renal REBECCA 2/2 to medication vs infectious etiology  cr improving   sono no hydro   please document accurate UO  LR at 75 cc/h   check orthostatic   will follow

## 2025-05-31 NOTE — PROGRESS NOTE ADULT - ASSESSMENT
54-year-old female PMH hypertension, hyperlipidemia, diabetes, degenerative disc disease, herniated disks, fibromyalgia, PTSD, left scitaica coming to ED for severe left lower back/buttock pain radiating down her posterior aspect of her left leg. Patient reports exacerbation of left-sided sciatica since mid March that worsened over the last week. She follows with pain management and has been taking steroids/gabapentin/Cymbalta/Excedrin with no relief.  Patient reports that she had vomiting and diarrhea due to "stomach bug" 4 days ago that has now resolved and that she had a syncopal? episode in the bathroom while trying to vomit but was able to break her fall by holding on to the toilet. She felt lightheaded and dizzy at the time.  Denies headache, head trauma, neck pain, chest pain, abdominal pain, no shortness of breath, nausea vomiting diarrhea constipation, unilateral numbness/weakness    #left Sciatica   #severe left leg pain , without neuromuscular deficit on exam   - Pt took prednisolone in the past that improved her pain but pain returned shortly after stopping meds  - Had steroid injection 3 yrs ago that helped and allowed her to participate in physical therapy  - Walks with cane but with difficulty and pain  - started prednisone 80mg qd  - morphine 4mg q4 PRN for severe pain -> discontinued as pt not requesting  - c/w gabapentin 300mg in AM, 300mg at noon  and 600mg at bedtime  - DC TORADOL, AVOID NSAIDS   - c/w duloxetine 60mg in AM and 20mg in PM  - Pain management c/s: MRI L spine shows multilevel degenerative changes, worse at L4-L5 with mild impingement of the descending left L5 nerve root by shallow disc bulge and facet hypertrophy, and L5-S1 with central disc protrusion abutting the descending bilateral S1 nerve roots. Incidental annular fissure noted at L5-S1. Follow up pain management regarding interventions and discharge pain control regimen    #orthostatic hypotension   #syncope  #Vertigo  - BP continues to have dizzy spells, BP soft with + orthostatics   - give IVF   - BP meds dc'ed  -Recheck orthostats today    #RBEECCA  - possibly 2/2 to hypovolemia or NSAIDs  - IVF 1L bolus given 05/31  - nephro recs appreciated  - RBUS, UA with microscopic add on -> NEGATIVE  - Monitor Cr    #HTN  - hold BP meds     #Depression  #PTSD  #Fibromyalgia  - c/w olanzapine 5mg qd  - c/w duloxetine 60 in am and 20 in PM    #DL  -  c/w atorvastatin    DVT PPX: lovenox  GI PPX: protonix  DIET: dash  ACTIVITY: IAT  CODE STATUS: FULL CODE  DISPOSITION: floors    PENDING: pain management, Cr monitoring   54-year-old female PMH hypertension, hyperlipidemia, diabetes, degenerative disc disease, herniated disks, fibromyalgia, PTSD, left scitaica coming to ED for severe left lower back/buttock pain radiating down her posterior aspect of her left leg. Patient reports exacerbation of left-sided sciatica since mid March that worsened over the last week. She follows with pain management and has been taking steroids/gabapentin/Cymbalta/Excedrin with no relief.  Patient reports that she had vomiting and diarrhea due to "stomach bug" 4 days ago that has now resolved and that she had a syncopal? episode in the bathroom while trying to vomit but was able to break her fall by holding on to the toilet. She felt lightheaded and dizzy at the time.  Denies headache, head trauma, neck pain, chest pain, abdominal pain, no shortness of breath, nausea vomiting diarrhea constipation, unilateral numbness/weakness    #left Sciatica   #severe left leg pain , without neuromuscular deficit on exam   - Pt took prednisolone in the past that improved her pain but pain returned shortly after stopping meds  - Had steroid injection 3 yrs ago that helped and allowed her to participate in physical therapy  - Walks with cane but with difficulty and pain  - started prednisone 80mg qd  - morphine 4mg q4 PRN for severe pain -> discontinued as pt not requesting  - c/w gabapentin 300mg in AM, 300mg at noon  and 600mg at bedtime  - DC TORADOL, AVOID NSAIDS   - c/w duloxetine 60mg in AM and 20mg in PM  - Pain management c/s: MRI L spine shows multilevel degenerative changes, worse at L4-L5 with mild impingement of the descending left L5 nerve root by shallow disc bulge and facet hypertrophy, and L5-S1 with central disc protrusion abutting the descending bilateral S1 nerve roots. Incidental annular fissure noted at L5-S1. Follow up pain management regarding interventions and discharge pain control regimen    #orthostatic hypotension - improved  #syncope  #Vertigo  - BP continues to have dizzy spells, BP soft with + orthostatics   - give IVF   - BP meds dc'ed- hold lisinopril and HCTZ  -Recheck orthostats - negative    #REBECCA- improving  - possibly 2/2 to hypovolemia or NSAIDs  - IVF 1L bolus given 05/31  - nephro recs appreciated  - RBUS, UA with microscopic add on -> NEGATIVE  - Monitor Cr    #HTN  - hold BP meds     #Depression  #PTSD  #Fibromyalgia  - c/w olanzapine 5mg qd  - c/w duloxetine 60 in am and 20 in PM    #DL  -  c/w atorvastatin    DVT PPX: lovenox  GI PPX: protonix  DIET: dash  ACTIVITY: IAT  CODE STATUS: FULL CODE  DISPOSITION: floors    PENDING: pain management, Cr monitoring

## 2025-06-01 LAB
ANION GAP SERPL CALC-SCNC: 15 MMOL/L — HIGH (ref 7–14)
BASOPHILS # BLD AUTO: 0.03 K/UL — SIGNIFICANT CHANGE UP (ref 0–0.2)
BASOPHILS NFR BLD AUTO: 0.2 % — SIGNIFICANT CHANGE UP (ref 0–1)
BUN SERPL-MCNC: 45 MG/DL — HIGH (ref 10–20)
CALCIUM SERPL-MCNC: 9.7 MG/DL — SIGNIFICANT CHANGE UP (ref 8.4–10.5)
CHLORIDE SERPL-SCNC: 101 MMOL/L — SIGNIFICANT CHANGE UP (ref 98–110)
CO2 SERPL-SCNC: 21 MMOL/L — SIGNIFICANT CHANGE UP (ref 17–32)
CREAT SERPL-MCNC: 1.1 MG/DL — SIGNIFICANT CHANGE UP (ref 0.7–1.5)
EGFR: 60 ML/MIN/1.73M2 — SIGNIFICANT CHANGE UP
EGFR: 60 ML/MIN/1.73M2 — SIGNIFICANT CHANGE UP
EOSINOPHIL # BLD AUTO: 0.03 K/UL — SIGNIFICANT CHANGE UP (ref 0–0.7)
EOSINOPHIL NFR BLD AUTO: 0.2 % — SIGNIFICANT CHANGE UP (ref 0–8)
GLUCOSE BLDC GLUCOMTR-MCNC: 145 MG/DL — HIGH (ref 70–99)
GLUCOSE BLDC GLUCOMTR-MCNC: 149 MG/DL — HIGH (ref 70–99)
GLUCOSE BLDC GLUCOMTR-MCNC: 171 MG/DL — HIGH (ref 70–99)
GLUCOSE BLDC GLUCOMTR-MCNC: 177 MG/DL — HIGH (ref 70–99)
GLUCOSE SERPL-MCNC: 98 MG/DL — SIGNIFICANT CHANGE UP (ref 70–99)
HCT VFR BLD CALC: 38.6 % — SIGNIFICANT CHANGE UP (ref 37–47)
HGB BLD-MCNC: 12.8 G/DL — SIGNIFICANT CHANGE UP (ref 12–16)
IMM GRANULOCYTES NFR BLD AUTO: 0.8 % — HIGH (ref 0.1–0.3)
LYMPHOCYTES # BLD AUTO: 43.5 % — SIGNIFICANT CHANGE UP (ref 20.5–51.1)
LYMPHOCYTES # BLD AUTO: 5.5 K/UL — HIGH (ref 1.2–3.4)
MCHC RBC-ENTMCNC: 29.5 PG — SIGNIFICANT CHANGE UP (ref 27–31)
MCHC RBC-ENTMCNC: 33.2 G/DL — SIGNIFICANT CHANGE UP (ref 32–37)
MCV RBC AUTO: 88.9 FL — SIGNIFICANT CHANGE UP (ref 81–99)
MONOCYTES # BLD AUTO: 0.94 K/UL — HIGH (ref 0.1–0.6)
MONOCYTES NFR BLD AUTO: 7.4 % — SIGNIFICANT CHANGE UP (ref 1.7–9.3)
NEUTROPHILS # BLD AUTO: 6.03 K/UL — SIGNIFICANT CHANGE UP (ref 1.4–6.5)
NEUTROPHILS NFR BLD AUTO: 47.9 % — SIGNIFICANT CHANGE UP (ref 42.2–75.2)
NRBC BLD AUTO-RTO: 0 /100 WBCS — SIGNIFICANT CHANGE UP (ref 0–0)
PLATELET # BLD AUTO: 331 K/UL — SIGNIFICANT CHANGE UP (ref 130–400)
PMV BLD: 10.6 FL — HIGH (ref 7.4–10.4)
POTASSIUM SERPL-MCNC: 4.7 MMOL/L — SIGNIFICANT CHANGE UP (ref 3.5–5)
POTASSIUM SERPL-SCNC: 4.7 MMOL/L — SIGNIFICANT CHANGE UP (ref 3.5–5)
RBC # BLD: 4.34 M/UL — SIGNIFICANT CHANGE UP (ref 4.2–5.4)
RBC # FLD: 13.4 % — SIGNIFICANT CHANGE UP (ref 11.5–14.5)
SODIUM SERPL-SCNC: 137 MMOL/L — SIGNIFICANT CHANGE UP (ref 135–146)
WBC # BLD: 12.63 K/UL — HIGH (ref 4.8–10.8)
WBC # FLD AUTO: 12.63 K/UL — HIGH (ref 4.8–10.8)

## 2025-06-01 PROCEDURE — 99232 SBSQ HOSP IP/OBS MODERATE 35: CPT

## 2025-06-01 RX ORDER — PREDNISONE 20 MG/1
40 TABLET ORAL DAILY
Refills: 0 | Status: COMPLETED | OUTPATIENT
Start: 2025-06-02 | End: 2025-06-04

## 2025-06-01 RX ADMIN — POLYETHYLENE GLYCOL 3350 17 GRAM(S): 17 POWDER, FOR SOLUTION ORAL at 05:17

## 2025-06-01 RX ADMIN — TIZANIDINE 4 MILLIGRAM(S): 4 TABLET ORAL at 23:17

## 2025-06-01 RX ADMIN — TIZANIDINE 4 MILLIGRAM(S): 4 TABLET ORAL at 00:00

## 2025-06-01 RX ADMIN — OXYCODONE HYDROCHLORIDE 5 MILLIGRAM(S): 30 TABLET ORAL at 23:18

## 2025-06-01 RX ADMIN — Medication 975 MILLIGRAM(S): at 05:50

## 2025-06-01 RX ADMIN — GABAPENTIN 600 MILLIGRAM(S): 400 CAPSULE ORAL at 21:29

## 2025-06-01 RX ADMIN — OXYCODONE HYDROCHLORIDE 5 MILLIGRAM(S): 30 TABLET ORAL at 23:50

## 2025-06-01 RX ADMIN — Medication 40 MILLIGRAM(S): at 05:17

## 2025-06-01 RX ADMIN — DULOXETINE 20 MILLIGRAM(S): 20 CAPSULE, DELAYED RELEASE ORAL at 21:29

## 2025-06-01 RX ADMIN — ENOXAPARIN SODIUM 40 MILLIGRAM(S): 100 INJECTION SUBCUTANEOUS at 21:30

## 2025-06-01 RX ADMIN — DULOXETINE 60 MILLIGRAM(S): 20 CAPSULE, DELAYED RELEASE ORAL at 11:11

## 2025-06-01 RX ADMIN — TIZANIDINE 4 MILLIGRAM(S): 4 TABLET ORAL at 17:02

## 2025-06-01 RX ADMIN — GABAPENTIN 300 MILLIGRAM(S): 400 CAPSULE ORAL at 11:11

## 2025-06-01 RX ADMIN — OLANZAPINE 5 MILLIGRAM(S): 10 TABLET ORAL at 14:41

## 2025-06-01 RX ADMIN — PREDNISONE 80 MILLIGRAM(S): 20 TABLET ORAL at 05:16

## 2025-06-01 RX ADMIN — Medication 975 MILLIGRAM(S): at 14:43

## 2025-06-01 RX ADMIN — TIZANIDINE 4 MILLIGRAM(S): 4 TABLET ORAL at 11:11

## 2025-06-01 RX ADMIN — OXYCODONE HYDROCHLORIDE 5 MILLIGRAM(S): 30 TABLET ORAL at 08:33

## 2025-06-01 RX ADMIN — Medication 2 TABLET(S): at 21:30

## 2025-06-01 RX ADMIN — Medication 975 MILLIGRAM(S): at 05:20

## 2025-06-01 RX ADMIN — TIZANIDINE 4 MILLIGRAM(S): 4 TABLET ORAL at 05:17

## 2025-06-01 RX ADMIN — GABAPENTIN 300 MILLIGRAM(S): 400 CAPSULE ORAL at 05:16

## 2025-06-01 RX ADMIN — POLYETHYLENE GLYCOL 3350 17 GRAM(S): 17 POWDER, FOR SOLUTION ORAL at 17:02

## 2025-06-01 RX ADMIN — ATORVASTATIN CALCIUM 40 MILLIGRAM(S): 80 TABLET, FILM COATED ORAL at 21:30

## 2025-06-01 NOTE — PROGRESS NOTE ADULT - ASSESSMENT
54-year-old female PMH hypertension, hyperlipidemia, diabetes, degenerative disc disease, herniated disks, fibromyalgia, PTSD, left scitaica coming to ED for severe left lower back/buttock pain radiating down her posterior aspect of her left leg. Patient reports exacerbation of left-sided sciatica since mid March that worsened over the last week. She follows with pain management and has been taking steroids/gabapentin/Cymbalta/Excedrin with no relief.  Patient reports that she had vomiting and diarrhea due to "stomach bug" 4 days ago that has now resolved and that she had a syncopal? episode in the bathroom while trying to vomit but was able to break her fall by holding on to the toilet. She felt lightheaded and dizzy at the time.  Denies headache, head trauma, neck pain, chest pain, abdominal pain, no shortness of breath, nausea vomiting diarrhea constipation, unilateral numbness/weakness    # severe left leg pain due to sciatica pain  - started prednisone 80mg qd- taper to 40 mg daily   - c/w gabapentin 300mg in AM, 300mg at noon  and 600mg at bedtime  - DC TORADOL, AVOID NSAIDS   - c/w duloxetine 60mg in AM and 20mg in PM  - oxycodone PRN  - Pain management c/s: MRI L spine shows multilevel degenerative changes, worse at L4-L5 with mild impingement of the descending left L5 nerve root by shallow disc bulge and facet hypertrophy, and L5-S1 with central disc protrusion abutting the descending bilateral S1 nerve roots. Incidental annular fissure noted at L5-S1.   - pain management follow up    #orthostatic hypotension - improved  #syncope  #Vertigo  - BP continues to have dizzy spells, BP soft with + orthostatics   - give IVF   - BP meds dc'ed- hold lisinopril and HCTZ  -Recheck orthostats - negative    #REBECCA- improved  - possibly 2/2 to hypovolemia/ NSAIDs  - IVF 1L bolus given 05/31  - nephro evaluated  - RBUS, UA with microscopic add on -> NEGATIVE  - Monitor BMP    #HTN  - hold BP meds - stable today    #Depression  #PTSD  #Fibromyalgia  - c/w olanzapine 5mg qd  - c/w duloxetine 60 in am and 20 in PM  - tizanidine    #DL  -  c/w atorvastatin    DVT PPX: lovenox    pending: pain management follow up for intervention, pain control  CODE STATUS: FULL CODE  DISPOSITION: floors    PENDING: pain management, Cr monitoring

## 2025-06-02 LAB
ANION GAP SERPL CALC-SCNC: 9 MMOL/L — SIGNIFICANT CHANGE UP (ref 7–14)
BASOPHILS # BLD AUTO: 0.02 K/UL — SIGNIFICANT CHANGE UP (ref 0–0.2)
BASOPHILS NFR BLD AUTO: 0.2 % — SIGNIFICANT CHANGE UP (ref 0–1)
BUN SERPL-MCNC: 33 MG/DL — HIGH (ref 10–20)
C3 SERPL-MCNC: 152 MG/DL — SIGNIFICANT CHANGE UP (ref 81–157)
CALCIUM SERPL-MCNC: 9.5 MG/DL — SIGNIFICANT CHANGE UP (ref 8.4–10.5)
CHLORIDE SERPL-SCNC: 103 MMOL/L — SIGNIFICANT CHANGE UP (ref 98–110)
CO2 SERPL-SCNC: 29 MMOL/L — SIGNIFICANT CHANGE UP (ref 17–32)
CREAT SERPL-MCNC: 0.9 MG/DL — SIGNIFICANT CHANGE UP (ref 0.7–1.5)
EGFR: 76 ML/MIN/1.73M2 — SIGNIFICANT CHANGE UP
EGFR: 76 ML/MIN/1.73M2 — SIGNIFICANT CHANGE UP
EOSINOPHIL # BLD AUTO: 0.02 K/UL — SIGNIFICANT CHANGE UP (ref 0–0.7)
EOSINOPHIL NFR BLD AUTO: 0.2 % — SIGNIFICANT CHANGE UP (ref 0–8)
GLUCOSE BLDC GLUCOMTR-MCNC: 134 MG/DL — HIGH (ref 70–99)
GLUCOSE BLDC GLUCOMTR-MCNC: 152 MG/DL — HIGH (ref 70–99)
GLUCOSE BLDC GLUCOMTR-MCNC: 159 MG/DL — HIGH (ref 70–99)
GLUCOSE BLDC GLUCOMTR-MCNC: 247 MG/DL — HIGH (ref 70–99)
GLUCOSE SERPL-MCNC: 100 MG/DL — HIGH (ref 70–99)
HCT VFR BLD CALC: 35.4 % — LOW (ref 37–47)
HGB BLD-MCNC: 11.9 G/DL — LOW (ref 12–16)
IMM GRANULOCYTES NFR BLD AUTO: 0.9 % — HIGH (ref 0.1–0.3)
LYMPHOCYTES # BLD AUTO: 45 % — SIGNIFICANT CHANGE UP (ref 20.5–51.1)
LYMPHOCYTES # BLD AUTO: 5.03 K/UL — HIGH (ref 1.2–3.4)
MCHC RBC-ENTMCNC: 29.9 PG — SIGNIFICANT CHANGE UP (ref 27–31)
MCHC RBC-ENTMCNC: 33.6 G/DL — SIGNIFICANT CHANGE UP (ref 32–37)
MCV RBC AUTO: 88.9 FL — SIGNIFICANT CHANGE UP (ref 81–99)
MONOCYTES # BLD AUTO: 0.8 K/UL — HIGH (ref 0.1–0.6)
MONOCYTES NFR BLD AUTO: 7.2 % — SIGNIFICANT CHANGE UP (ref 1.7–9.3)
NEUTROPHILS # BLD AUTO: 5.2 K/UL — SIGNIFICANT CHANGE UP (ref 1.4–6.5)
NEUTROPHILS NFR BLD AUTO: 46.5 % — SIGNIFICANT CHANGE UP (ref 42.2–75.2)
NRBC BLD AUTO-RTO: 0 /100 WBCS — SIGNIFICANT CHANGE UP (ref 0–0)
PLATELET # BLD AUTO: 273 K/UL — SIGNIFICANT CHANGE UP (ref 130–400)
PMV BLD: 10.4 FL — SIGNIFICANT CHANGE UP (ref 7.4–10.4)
POTASSIUM SERPL-MCNC: 4.9 MMOL/L — SIGNIFICANT CHANGE UP (ref 3.5–5)
POTASSIUM SERPL-SCNC: 4.9 MMOL/L — SIGNIFICANT CHANGE UP (ref 3.5–5)
RBC # BLD: 3.98 M/UL — LOW (ref 4.2–5.4)
RBC # FLD: 13.3 % — SIGNIFICANT CHANGE UP (ref 11.5–14.5)
SODIUM SERPL-SCNC: 141 MMOL/L — SIGNIFICANT CHANGE UP (ref 135–146)
WBC # BLD: 11.17 K/UL — HIGH (ref 4.8–10.8)
WBC # FLD AUTO: 11.17 K/UL — HIGH (ref 4.8–10.8)

## 2025-06-02 PROCEDURE — 99232 SBSQ HOSP IP/OBS MODERATE 35: CPT | Mod: GC

## 2025-06-02 PROCEDURE — 99232 SBSQ HOSP IP/OBS MODERATE 35: CPT | Mod: 25

## 2025-06-02 PROCEDURE — 62323 NJX INTERLAMINAR LMBR/SAC: CPT

## 2025-06-02 RX ORDER — DIAZEPAM 5 MG/1
2 TABLET ORAL ONCE
Refills: 0 | Status: DISCONTINUED | OUTPATIENT
Start: 2025-06-02 | End: 2025-06-02

## 2025-06-02 RX ORDER — METHYLPREDNISOLONE ACETATE 80 MG/ML
80 INJECTION, SUSPENSION INTRA-ARTICULAR; INTRALESIONAL; INTRAMUSCULAR; SOFT TISSUE ONCE
Refills: 0 | Status: DISCONTINUED | OUTPATIENT
Start: 2025-06-02 | End: 2025-06-02

## 2025-06-02 RX ORDER — METHYLPREDNISOLONE ACETATE 80 MG/ML
80 INJECTION, SUSPENSION INTRA-ARTICULAR; INTRALESIONAL; INTRAMUSCULAR; SOFT TISSUE ONCE
Refills: 0 | Status: COMPLETED | OUTPATIENT
Start: 2025-06-02 | End: 2025-06-02

## 2025-06-02 RX ADMIN — TIZANIDINE 4 MILLIGRAM(S): 4 TABLET ORAL at 05:21

## 2025-06-02 RX ADMIN — DULOXETINE 60 MILLIGRAM(S): 20 CAPSULE, DELAYED RELEASE ORAL at 11:12

## 2025-06-02 RX ADMIN — GABAPENTIN 300 MILLIGRAM(S): 400 CAPSULE ORAL at 05:19

## 2025-06-02 RX ADMIN — TIZANIDINE 4 MILLIGRAM(S): 4 TABLET ORAL at 17:26

## 2025-06-02 RX ADMIN — TIZANIDINE 4 MILLIGRAM(S): 4 TABLET ORAL at 11:12

## 2025-06-02 RX ADMIN — POLYETHYLENE GLYCOL 3350 17 GRAM(S): 17 POWDER, FOR SOLUTION ORAL at 05:22

## 2025-06-02 RX ADMIN — METHYLPREDNISOLONE ACETATE 80 MILLIGRAM(S): 80 INJECTION, SUSPENSION INTRA-ARTICULAR; INTRALESIONAL; INTRAMUSCULAR; SOFT TISSUE at 14:47

## 2025-06-02 RX ADMIN — POLYETHYLENE GLYCOL 3350 17 GRAM(S): 17 POWDER, FOR SOLUTION ORAL at 17:26

## 2025-06-02 RX ADMIN — Medication 2 TABLET(S): at 21:49

## 2025-06-02 RX ADMIN — PREDNISONE 40 MILLIGRAM(S): 20 TABLET ORAL at 05:20

## 2025-06-02 RX ADMIN — Medication 975 MILLIGRAM(S): at 05:20

## 2025-06-02 RX ADMIN — ATORVASTATIN CALCIUM 40 MILLIGRAM(S): 80 TABLET, FILM COATED ORAL at 21:47

## 2025-06-02 RX ADMIN — GABAPENTIN 300 MILLIGRAM(S): 400 CAPSULE ORAL at 11:12

## 2025-06-02 RX ADMIN — OLANZAPINE 5 MILLIGRAM(S): 10 TABLET ORAL at 21:50

## 2025-06-02 RX ADMIN — OXYCODONE HYDROCHLORIDE 5 MILLIGRAM(S): 30 TABLET ORAL at 17:25

## 2025-06-02 RX ADMIN — GABAPENTIN 600 MILLIGRAM(S): 400 CAPSULE ORAL at 21:49

## 2025-06-02 RX ADMIN — Medication 40 MILLIGRAM(S): at 05:21

## 2025-06-02 RX ADMIN — Medication 975 MILLIGRAM(S): at 05:50

## 2025-06-02 RX ADMIN — DULOXETINE 20 MILLIGRAM(S): 20 CAPSULE, DELAYED RELEASE ORAL at 21:47

## 2025-06-02 RX ADMIN — DIAZEPAM 2 MILLIGRAM(S): 5 TABLET ORAL at 13:27

## 2025-06-02 NOTE — PROGRESS NOTE ADULT - NS ATTEST RISK PROBLEM GEN_ALL_CORE FT
This 54-year-old female with a past medical history of hypertension, hyperlipidemia, diabetes, degenerative disc disease, herniated discs, fibromyalgia, PTSD, and left sciatica presents to the ED with severe left lower back/buttock pain radiating down her posterior left leg, representing a worsening of her chronic left-sided sciatica. Her multiple chronic pain conditions, diabetes, and the severity of her current pain episode place her at moderate risk.

## 2025-06-02 NOTE — ASU PREOP CHECKLIST - SKIN PREP
Urology Telephone Progress Note  November 17, 2020    PCP: Toshia Grande MD     CC: Follow up after starting tolteradine for BPH.    HPI:  Dudley Zapata is a 73 year old white male with  hx as below     HX  1. h/o microscopic hematuria and neg cysto/cytology and CT 08/2012.   2. s/p PVP 12/3/2014 110K joules  3. On detrol BID and myerbetriq in the past. DC'd after botox injection.   4. S/P cystoscopy and uroflow done 7/2015  5. 10/26/16 s/p Cystoscopy with chemo denervation of the bladder with 100 units of Botox.  5.  7/2017 started on tolterodine  6. Evaluated for InterStim with Dr. Armstrong summer 2019 - recommended increased timed voids and CIC. CIC q3-4 hours and timed void every 2 hors.  7. Stopped tolterodine 10/2020 - improved cognition off of it.    Patient returns for his urinary urge incontinence after PVP in 2014 with parkinsonism.  Has stopped tolterodine.  Pt has improved alertness once he awakens.    Less confusion.    CIC continues 4 x- 6x per day  No increased leakage of significance.   Wife notices telescoping penis.              reports that he quit smoking about 50 years ago. His smoking use included cigarettes. He started smoking about 55 years ago. He has a 2.00 pack-year smoking history. He has never used smokeless tobacco.        ALLERGIES:   Allergen Reactions   • Donepezil Nausea & Vomiting   • Klaron [Sulfacetamide Sodium] RASH     Burning sensation on face with this rosacea cm   • Adhesive   (Environmental) ERYTHEMA     EKG patches    • Ambien Other (See Comments)     CONFUSION   • Quetiapine Other (See Comments)     Dystonic reaction . AMS   • Zolpidem Other (See Comments)       Current Outpatient Medications   Medication Sig Dispense Refill   • losartan (COZAAR) 50 MG tablet TAKE 1 TABLET DAILY 90 tablet 1   • furosemide (LASIX) 20 MG tablet Take 1 tablet by mouth daily. 90 tablet 3   • Lubricants (Lubricating Jelly) Gel APPLY SMALL AMOUNT TOPICALLY TWICE A DAY AS NEEDED     •  metFORMIN (GLUCOPHAGE-XR) 500 MG 24 hr tablet      • metoPROLOL succinate (TOPROL-XL) 50 MG 24 hr tablet TAKE 1 TABLET DAILY 90 tablet 3   • traMADol (Ultram) 50 MG tablet Take 1 tablet by mouth every 6 hours as needed for Pain. 120 tablet 0   • diclofenac (VOLTAREN) 1 % gel Apply 2 g topically 4 times daily. To affected area. 300 g 1   • traMADol (ULTRAM) 50 MG tablet Take 1 tablet by mouth every 6 hours as needed for Pain. 40 tablet 1   • magnesium hydroxide ([employer] MILK OF MAGNESIA) 400 MG/5ML suspension Take 30 mLs by mouth daily as needed.     • bisacodyl (DULCOLAX) 10 MG suppository Place 10 mg rectally daily as needed.     • Sodium Phosphates (DISPOSABLE ENEMA RE) Place 118 mLs rectally daily as needed.     • acetaminophen (TYLENOL) 325 MG tablet Take 650 mg by mouth every 4 hours as needed.     • Dextrose, Diabetic Use, (INSTA-GLUCOSE PO) Take 1 Tube by mouth as needed.     • Polyethylene Glycol 3350 (GAVILAX PO) Take 17 g by mouth daily as needed.     • carboxymethylcellulose-glycerin (OPTIVE) 0.5-0.9 % ophthalmic solution Place 1 drop into both eyes as needed.     • allopurinol (ZYLOPRIM) 100 MG tablet TAKE 1 TABLET DAILY 90 tablet 3   • CARBOXYMethylcellulose (REFRESH PLUS) 0.5 % ophthalmic solution Place 1 drop into both eyes as needed for Dry Eyes.     • esomeprazole (NEXIUM) 40 MG capsule Take 1 capsule by mouth daily. 90 capsule 1   • melatonin 3 MG Take 1 tablet by mouth nightly as needed (sleep). 30 tablet 0   • carbidopa-levodopa (SINEMET)  MG per tablet Take 2.5 tablets by mouth 3 times daily.      • fluticasone (FLONASE) 50 MCG/ACT nasal spray Spray 2 sprays in each nostril daily.     • simvastatin (ZOCOR) 80 MG tablet TAKE 1 TABLET NIGHTLY 90 tablet 4   • lidocaine (LIDODERM) 5 % patch Place 1 patch onto the skin every 24 hours. Remove patch 12 hours after applying 30 patch 0   • fluocinonide 0.1 % Cream Apply to affected area BID for up to one week 60 g 2   • DISPENSE Bilateral lower  extremity compression stockings  30 mmgh  of compression    Dx bilateral lower extremity swelling 1 Units 0   • acetaminophen (TYLENOL) 500 MG tablet Take 1,000 mg by mouth every 6 hours as needed for Pain.     • clotrimazole (LOTRIMIN) 1 % cream Apply to affected area twice daily 30 g 2   • aspirin 81 MG tablet Take 81 mg by mouth daily.     • nitroGLYcerin (NITROSTAT) 0.4 MG SL tablet Place 1 tablet under the tongue every 5 minutes as needed for Chest pain. 30 tablet 2     No current facility-administered medications for this visit.      REVIEW OF SYSTEMS:  ROS documented in nurses notes reviewed and I concur.     PHYSICAL EXAM:  There were no vitals taken for this visit.     NO EXAM TELEPHONE VISIT>       Labs:  PSA, Total (ng/mL)   Date Value   09/23/2019 1.35   04/10/2018 0.78   07/15/2017 1.71   05/16/2017 11.70 (H)   09/19/2016 1.05   03/01/2016 0.98   08/27/2015 0.89   08/27/2013 0.93   06/07/2012 0.66   06/03/2011 0.55     Prostate Specific Antigen (ng/mL)   Date Value   10/21/2020 1.15     Creatinine (mg/dL)   Date Value   11/11/2020 1.30 (H)   10/29/2020 1.20 (H)   10/21/2020 1.40 (H)   10/09/2020 1.33 (H)   07/07/2020 1.21 (H)   06/13/2020 1.16   06/11/2020 1.22 (H)   06/09/2020 1.31 (H)   06/06/2020 2.12 (H)   06/05/2020 2.24 (H)   02/25/2020 1.50 (H)   12/11/2019 1.46 (H)   11/10/2019 1.34 (H)   11/09/2019 1.56 (H)   10/22/2019 1.60 (H)   10/12/2019 1.33 (H)   10/05/2019 1.50 (H)   09/23/2019 1.24 (H)   06/07/2019 1.60 (H)   05/28/2019 1.34 (H)   02/17/2019 1.20 (H)   01/11/2019 1.18 (H)   08/28/2018 0.90   04/10/2018 1.15   03/02/2018 1.22 (H)   12/12/2017 1.25 (H)   11/12/2017 1.30 (H)   09/29/2017 1.06   09/28/2017 1.07   09/27/2017 1.40 (H)   08/29/2017 1.38 (H)   08/02/2017 1.09   05/30/2017 1.13   03/14/2017 1.14   12/16/2016 1.31 (H)   11/28/2016 1.32 (H)   10/14/2016 1.16   09/19/2016 1.01   04/15/2016 1.45 (H)   04/11/2016 1.01   03/01/2016 1.18 (H)   12/10/2015 1.30 (H)   09/20/2015 1.40 (H)    08/27/2015 1.42 (H)   02/20/2015 1.36 (H)   12/04/2014 1.40 (H)   11/26/2014 1.45 (H)   10/31/2014 1.37 (H)   10/17/2014 1.50 (H)   08/16/2014 1.31 (H)   08/15/2013 1.49 (H)   12/08/2012 1.09   09/24/2012 1.14   07/23/2012 1.12   06/07/2012 1.05   12/08/2011 0.99   11/14/2011 0.95   10/25/2011 0.90   06/03/2011 0.68     Component REFLEXIVE URINE CULTURE   Latest Ref Rng & Units    5/29/2020 >100,000 CFU/mL Escherichia coli (A)   6/7/2020 No Growth.   9/9/2020 >100,000 CFU/mL Escherichia coli (A)     Component      Latest Ref Rng & Units 10/22/2020   COLOR       Yellow   CLARITY       Clear   GLUCOSE(URINE)      Negative mg/dL Negative   BILIRUBIN      Negative Negative   KETONES      Negative mg/dL Negative   SPECIFIC GRAVITY      1.005 - 1.030 1.025   BLOOD      Negative Moderate (A)   pH      5.0 - 7.0 5.5   PROTEIN(URINE)      Negative mg/dL Negative   UROBILINOGEN      0.2, 1.0 mg/dL 0.2   NITRITE      Negative Negative   LEUKOCYTE ESTERASE      Negative Negative   Squamous EPI'S      None Seen, 1 to 5 /hpf None Seen   ERYTHROCYTES, URINALYSIS      None Seen, 1 to 2 /hpf 11 to 25 (A)   LEUKOCYTES, URINALYSIS      None Seen, 1 to 5 /hpf 1 to 5   BACTERIA, URINALYSIS      None Seen /hpf None Seen   HYALINE CASTS, URINALYSIS      None Seen, 1 to 5 /lpf None Seen     Assessment:  73 year old male, hx cardiac disease with prior bypass, hx of microscopic hematuria, BPH with LUTS/OAB s/p PVP December 2014, cystoscopy shows wide-open bladder neck July 2015, failed detrol and myrbetriq. s/p Cystoscopy with chemo denervation of the bladder with 100 units of Botox, 10/26/16.     Stopped tolterodine - more alert!    Plan:  1. Continue CIC 4-6 times per day  2. Tolterodine stopped  3. May inquire about cost of Myrbetriq and retry Myrbetriq if cost effective  4. Follow up 5-6 months.     This visit is being performed via phone to discuss Follow-up (TELEPHONE - 3 wk F/U Neurogenic Bladder + CIC ) and Office  Visit    Clinician Location: [facility]    Dudley is in [place] and his identity has been established.   He was informed that consent to treat includes permission to submit charges to the applicable insurance on file. Dudley was advised regarding the potential risk inherent in video visits, as the assessment may be limited due to what can be seen on the screen which potentially results in an incomplete assessment; as well as either of us may discontinue the video visit if it is felt that the videoconferencing connections are not adequate for his/her situation.   11-20 minutes were spent in this encounter.      Berhane Toth M.D.           n/a

## 2025-06-02 NOTE — PROGRESS NOTE ADULT - ASSESSMENT
54-year-old female PMH hypertension, hyperlipidemia, diabetes, degenerative disc disease, herniated disks, fibromyalgia, PTSD, left scitaica coming to ED for severe left lower back/buttock pain radiating down her posterior aspect of her left leg. Patient reports exacerbation of left-sided sciatica since mid March that worsened over the last week. She follows with pain management and has been taking steroids/gabapentin/Cymbalta/Excedrin with no relief.  Patient reports that she had vomiting and diarrhea due to "stomach bug" 4 days ago that has now resolved and that she had a syncopal? episode in the bathroom while trying to vomit but was able to break her fall by holding on to the toilet. She felt lightheaded and dizzy at the time.  Denies headache, head trauma, neck pain, chest pain, abdominal pain, no shortness of breath, nausea vomiting diarrhea constipation, unilateral numbness/weakness    # severe left leg pain due to sciatica pain  - started prednisone 80mg qd- taper to 40 mg daily   - c/w gabapentin 300mg in AM, 300mg at noon  and 600mg at bedtime  - DC TORADOL, AVOID NSAIDS   - c/w duloxetine 60mg in AM and 20mg in PM  - oxycodone PRN  - Pain management c/s: MRI L spine shows multilevel degenerative changes, worse at L4-L5 with mild impingement of the descending left L5 nerve root by shallow disc bulge and facet hypertrophy, and L5-S1 with central disc protrusion abutting the descending bilateral S1 nerve roots. Incidental annular fissure noted at L5-S1.   - pain management follow up    #orthostatic hypotension - improved  #syncope  #Vertigo  - BP continues to have dizzy spells, BP soft with + orthostatics   - give IVF   - BP meds dc'ed- hold lisinopril and HCTZ  -Recheck orthostats - negative    #REBECCA- improved  - possibly 2/2 to hypovolemia/ NSAIDs  - IVF 1L bolus given 05/31  - nephro evaluated  - RBUS, UA with microscopic add on -> NEGATIVE  - Monitor BMP    #HTN  - hold BP meds - stable today    #Depression  #PTSD  #Fibromyalgia  - c/w olanzapine 5mg qd  - c/w duloxetine 60 in am and 20 in PM  - tizanidine    #DL  -  c/w atorvastatin    DVT PPX: lovenox    pending: pain management follow up for intervention, pain control  CODE STATUS: FULL CODE

## 2025-06-02 NOTE — PROCEDURE NOTE - ADDITIONAL PROCEDURE DETAILS
Procedure: Left L5-S1 Interlaminar Epidural Steroid Injection, Fluoroscopy Guided    Indications:  Lumbar Radiculopathy    Consent: Informed consent was obtained after discussing the risks, benefits, and alternatives to the procedure, including infection, bleeding, nerve damage, and dural puncture.    Medications:    Depo-Medrol (Methylprednisolone Acetate) 80 mg  Lidocaine 2% - 2 cc  Omnipaque (Iohexol) 2cc     Technique:    The patient was positioned prone on the fluoroscopy table. The skin overlying the left L5-S1 interlaminar space was prepped and draped in a sterile fashion. Using fluoroscopic guidance, a 22-gauge spinal needle was advanced into the epidural space at the L5-S1 level via an interlaminar approach. After careful needle positioning under fluoroscopy, a small amount of Omnipaque was injected to confirm proper needle placement within the epidural space and to assess the distribution of contrast. Following negative aspiration for cerebrospinal fluid (CSF) and blood, 2 cc of 2% lidocaine and 80 mg of Depo-Medrol were injected. The needle was then removed.    Post-Procedure:    The patient tolerated the procedure well without immediate complications. Post-procedure instructions were provided, including monitoring for any adverse reactions (e.g., infection, bleeding, nerve injury, or worsening pain). The patient was discharged in stable condition.    Complications: None

## 2025-06-02 NOTE — PROGRESS NOTE ADULT - ATTENDING COMMENTS
54-year-old female PMH hypertension, hyperlipidemia, diabetes, degenerative disc disease, herniated disks, fibromyalgia, PTSD, left scitaica coming to ED for severe left lower back/buttock pain radiating down her posterior aspect of her left leg. Patient reports exacerbation of left-sided sciatica since mid March that worsened over the last week. She follows with pain management and has been taking steroids/gabapentin/Cymbalta/Excedrin with no relief.  Patient reports that she had vomiting and diarrhea due to "stomach bug" 4 days ago that has now resolved and that she had a syncopal? episode in the bathroom while trying to vomit but was able to break her fall by holding on to the toilet. She felt lightheaded and dizzy at the time.  Denies headache, head trauma, neck pain, chest pain, abdominal pain, no shortness of breath, nausea vomiting diarrhea constipation, unilateral numbness/weakness    # severe left leg pain due to sciatica pain  - started prednisone 80mg qd- taper to 40 mg daily   - c/w gabapentin 300mg in AM, 300mg at noon  and 600mg at bedtime  - DC TORADOL, AVOID NSAIDS   - c/w duloxetine 60mg in AM and 20mg in PM  - oxycodone PRN  - Pain management c/s: MRI L spine shows multilevel degenerative changes, worse at L4-L5 with mild impingement of the descending left L5 nerve root by shallow disc bulge and facet hypertrophy, and L5-S1 with central disc protrusion abutting the descending bilateral S1 nerve roots. Incidental annular fissure noted at L5-S1.   - pain management planning for eidural steroid injection today    #orthostatic hypotension - improved  #syncope  #Vertigo  - BP continues to have dizzy spells, BP soft with + orthostatics   - give IVF   - BP meds dc'ed- hold lisinopril and HCTZ  -Recheck orthostats - negative    #REBECCA- improved  - possibly 2/2 to hypovolemia/ NSAIDs  - IVF 1L bolus given 05/31  - nephro evaluated  - RBUS, UA with microscopic add on -> NEGATIVE  - Monitor BMP    #HTN  - hold BP meds - stable today    #Depression  #PTSD  #Fibromyalgia  - c/w olanzapine 5mg qd  - c/w duloxetine 60 in am and 20 in PM  - tizanidine    #DL  -  c/w atorvastatin    DVT PPX: hold lovenox    pending: pain management follow up for intervention, pain control  CODE STATUS: FULL CODE  DISPOSITION: floors    PENDING: pain control  possible discharge plan in tomorrow

## 2025-06-02 NOTE — PROGRESS NOTE ADULT - ASSESSMENT
A/P:  Patient is a 54F with PMH of hypertension, hyperlipidemia, diabetes, degenerative disc disease, herniated disks, fibromyalgia, PTSD, left scitaica coming to ED for severe left lower back/buttock pain radiating down her posterior aspect of her left leg. Patient reports exacerbation of left-sided sciatica since mid March that worsened over the last week. She follows with pain management and has been taking steroids/gabapentin/Cymbalta/Excedrin with no relief.    Patient evaluated at bedside, complaining of worsening left lower back pain despite current regimen. Patient follows outpatient pain management - Dr. Tony and was started on a steroid pack, pending EMG and possible MRI. Patient says she had an epidural 3 years ago with moderate relief and participated in PT. She has also not had a recent MRI in past 3-4 years.     PLAN  - Patient scheduled for [facility] today (6/2) 2pm  - Please order PO Valium 5mg prior to procedure  - Hold any anticoagulation prior to procedure  - Plan discussed with patient

## 2025-06-03 ENCOUNTER — TRANSCRIPTION ENCOUNTER (OUTPATIENT)
Age: 55
End: 2025-06-03

## 2025-06-03 ENCOUNTER — APPOINTMENT (OUTPATIENT)
Dept: PAIN MANAGEMENT | Facility: CLINIC | Age: 55
End: 2025-06-03

## 2025-06-03 LAB
GLUCOSE BLDC GLUCOMTR-MCNC: 117 MG/DL — HIGH (ref 70–99)
GLUCOSE BLDC GLUCOMTR-MCNC: 133 MG/DL — HIGH (ref 70–99)
GLUCOSE BLDC GLUCOMTR-MCNC: 158 MG/DL — HIGH (ref 70–99)
GLUCOSE BLDC GLUCOMTR-MCNC: 167 MG/DL — HIGH (ref 70–99)

## 2025-06-03 PROCEDURE — 99232 SBSQ HOSP IP/OBS MODERATE 35: CPT

## 2025-06-03 RX ORDER — MECLIZINE HCL 12.5 MG
1 TABLET ORAL
Refills: 0 | DISCHARGE

## 2025-06-03 RX ADMIN — POLYETHYLENE GLYCOL 3350 17 GRAM(S): 17 POWDER, FOR SOLUTION ORAL at 17:39

## 2025-06-03 RX ADMIN — TIZANIDINE 4 MILLIGRAM(S): 4 TABLET ORAL at 17:39

## 2025-06-03 RX ADMIN — DULOXETINE 20 MILLIGRAM(S): 20 CAPSULE, DELAYED RELEASE ORAL at 21:53

## 2025-06-03 RX ADMIN — TIZANIDINE 4 MILLIGRAM(S): 4 TABLET ORAL at 01:28

## 2025-06-03 RX ADMIN — POLYETHYLENE GLYCOL 3350 17 GRAM(S): 17 POWDER, FOR SOLUTION ORAL at 05:46

## 2025-06-03 RX ADMIN — Medication 1 APPLICATION(S): at 05:47

## 2025-06-03 RX ADMIN — GABAPENTIN 600 MILLIGRAM(S): 400 CAPSULE ORAL at 21:54

## 2025-06-03 RX ADMIN — DULOXETINE 60 MILLIGRAM(S): 20 CAPSULE, DELAYED RELEASE ORAL at 13:03

## 2025-06-03 RX ADMIN — OLANZAPINE 5 MILLIGRAM(S): 10 TABLET ORAL at 21:53

## 2025-06-03 RX ADMIN — OXYCODONE HYDROCHLORIDE 5 MILLIGRAM(S): 30 TABLET ORAL at 07:56

## 2025-06-03 RX ADMIN — GABAPENTIN 300 MILLIGRAM(S): 400 CAPSULE ORAL at 05:47

## 2025-06-03 RX ADMIN — ATORVASTATIN CALCIUM 40 MILLIGRAM(S): 80 TABLET, FILM COATED ORAL at 21:52

## 2025-06-03 RX ADMIN — PREDNISONE 40 MILLIGRAM(S): 20 TABLET ORAL at 05:46

## 2025-06-03 RX ADMIN — TIZANIDINE 4 MILLIGRAM(S): 4 TABLET ORAL at 23:42

## 2025-06-03 RX ADMIN — TIZANIDINE 4 MILLIGRAM(S): 4 TABLET ORAL at 13:03

## 2025-06-03 RX ADMIN — Medication 40 MILLIGRAM(S): at 05:46

## 2025-06-03 RX ADMIN — TIZANIDINE 4 MILLIGRAM(S): 4 TABLET ORAL at 05:46

## 2025-06-03 RX ADMIN — GABAPENTIN 300 MILLIGRAM(S): 400 CAPSULE ORAL at 13:03

## 2025-06-03 RX ADMIN — Medication 2 TABLET(S): at 21:51

## 2025-06-03 NOTE — PROGRESS NOTE ADULT - REASON FOR ADMISSION
fall/syncope

## 2025-06-03 NOTE — PROGRESS NOTE ADULT - PROVIDER SPECIALTY LIST ADULT
Internal Medicine
Nephrology
Pain Medicine

## 2025-06-03 NOTE — DISCHARGE NOTE PROVIDER - ATTENDING DISCHARGE PHYSICAL EXAMINATION:
T(C): 36.8 (06-04-25 @ 07:20), Max: 37 (06-03-25 @ 23:12)  HR: 80 (06-04-25 @ 07:20) (64 - 80)  BP: 138/88 (06-04-25 @ 07:20) (117/74 - 138/88)  RR: 18 (06-04-25 @ 07:20) (18 - 18)  SpO2: 96% (06-04-25 @ 07:20) (96% - 97%)  O/E: awake, alert  HEENT: atraumatic , EOMI  Chest: clear  CVS: S1S2+, no murmur  P/A: soft , BS  CNS: awake, alert  Ext : no edema feet

## 2025-06-03 NOTE — PROGRESS NOTE ADULT - ATTENDING COMMENTS
54-year-old female PMH hypertension, hyperlipidemia, diabetes, degenerative disc disease, herniated disks, fibromyalgia, PTSD, left scitaica coming to ED for severe left lower back/buttock pain radiating down her posterior aspect of her left leg. Patient reports exacerbation of left-sided sciatica since mid March that worsened over the last week. She follows with pain management and has been taking steroids/gabapentin/Cymbalta/Excedrin with no relief.  Patient reports that she had vomiting and diarrhea due to "stomach bug" 4 days ago that has now resolved and that she had a syncopal? episode in the bathroom while trying to vomit but was able to break her fall by holding on to the toilet. She felt lightheaded and dizzy at the time.  Denies headache, head trauma, neck pain, chest pain, abdominal pain, no shortness of breath, nausea vomiting diarrhea constipation, unilateral numbness/weakness    # severe left leg pain due to sciatica pain  - started prednisone 80mg qd- taper to 40 mg daily   - c/w gabapentin 300mg in AM, 300mg at noon  and 600mg at bedtime  - DC TORADOL, AVOID NSAIDS   - c/w duloxetine 60mg in AM and 20mg in PM  - oxycodone PRN  - Pain management c/s: MRI L spine shows multilevel degenerative changes, worse at L4-L5 with mild impingement of the descending left L5 nerve root by shallow disc bulge and facet hypertrophy, and L5-S1 with central disc protrusion abutting the descending bilateral S1 nerve roots. Incidental annular fissure noted at L5-S1.   - s/p eidural steroid injection on 6/2  pain is overall improving    #orthostatic hypotension - improved  #syncope  #Vertigo  - BP continues to have dizzy spells, BP soft with + orthostatics   - give IVF   - BP meds dc'ed- hold lisinopril and HCTZ  -Recheck orthostats - negative    #REBECCA- improved  - possibly 2/2 to hypovolemia/ NSAIDs  - IVF 1L bolus given 05/31  - nephro evaluated  - RBUS, UA with microscopic add on -> NEGATIVE  - Monitor BMP    #HTN  - hold BP meds - stable today    #Depression  #PTSD  #Fibromyalgia  - c/w olanzapine 5mg qd  - c/w duloxetine 60 in am and 20 in PM  - tizanidine    #DL-  c/w atorvastatin    DVT PPX: hold lovenox  plan of care d/w patient; discharge plan discussed with patient; patient hoping for discharge plan for tomorrow  pending: prepare for discharge; pain control    pending: pain control  possible discharge plan tomorrow .

## 2025-06-03 NOTE — PROGRESS NOTE ADULT - SUBJECTIVE AND OBJECTIVE BOX
HPI: Patient is a 54F with PMH of hypertension, hyperlipidemia, diabetes, degenerative disc disease, herniated disks, fibromyalgia, PTSD, left scitaica coming to ED for severe left lower back/buttock pain radiating down her posterior aspect of her left leg. Patient reports exacerbation of left-sided sciatica since mid March that worsened over the last week. She follows with pain management and has been taking steroids/gabapentin/Cymbalta/Excedrin with no relief.    Current Inpatient Medication Regimen:  MEDICATIONS  (STANDING):  atorvastatin 40 milliGRAM(s) Oral at bedtime  chlorhexidine 2% Cloths 1 Application(s) Topical <User Schedule>  diazepam    Tablet 2 milliGRAM(s) Oral once  DULoxetine 20 milliGRAM(s) Oral at bedtime  DULoxetine 60 milliGRAM(s) Oral daily  enoxaparin Injectable 40 milliGRAM(s) SubCutaneous every 24 hours  gabapentin 600 milliGRAM(s) Oral at bedtime  gabapentin 300 milliGRAM(s) Oral <User Schedule>  OLANZapine 5 milliGRAM(s) Oral daily  pantoprazole    Tablet 40 milliGRAM(s) Oral before breakfast  polyethylene glycol 3350 17 Gram(s) Oral two times a day  predniSONE   Tablet 40 milliGRAM(s) Oral daily  senna 2 Tablet(s) Oral at bedtime  tiZANidine 4 milliGRAM(s) Oral every 6 hours    MEDICATIONS  (PRN):  acetaminophen     Tablet .. 975 milliGRAM(s) Oral every 6 hours PRN Mild Pain (1 - 3)  oxyCODONE    IR 5 milliGRAM(s) Oral four times a day PRN Moderate Pain (4 - 6)      Allergies:  penicillin (Anaphylaxis)      Past Medical History:  Syncope and collapse    Radiculopathy, lumbar region-M54.16    Family history of acute myocardial infarction (Mother)    Family history of CHF (congestive heart failure) (Sibling)    Family history of early CAD (Aunt)    Handoff    MEWS Score    HTN (hypertension)    Fibromyalgia    PTSD (post-traumatic stress disorder)    Autoimmune hepatitis    DJD (degenerative joint disease)    Chronic lower back pain    HLD (hyperlipidemia)    Anxiety    Depression    Obesity    Chronic neck and back pain    Uveitis    History of endometrial hyperplasia    H/O sciatica    Syncope    History of liver biopsy    H/O  section    History of dilatation and curettage    L SIDE PAIN    Back pain        Review of Systems:  General: no fevers or chills  Eyes: no diplopia or blurred vision  ENT: no rhinorrhea  CV: no chest pain  Resp: no cough or dyspnea  GI: no abdominal pain, constipation, or diarrhea  : no urinary incontinence or dysuria  Neuro: no focal weakness or numbness     Physical Exam:  T(C): 36.8 (25 @ 00:06), Max: 37.1 (25 @ 20:42)  HR: 85 (25 @ 05:31) (85 - 115)  BP: 121/71 (25 @ 05:31) (100/66 - 132/90)  RR: 18 (25 @ 00:06) (18 - 18)  SpO2: 99% (25 @ 00:06) (95% - 99%)  Gen: NAD  Eyes: no glasses or scleral icterus  Head: Normocephalic / Atraumatic  CV: no JVD  Lungs: nonlabored breathing  Abdomen: nondistended, soft  : no camilo catheter in place  Back: tenderness to palpation  Neuro: AOx3  Extremities: full ROM in upper/lower extremities  Psych: normal affect      Labs:  CBC  11.69 K/uL[H] [4.80 - 10.80] > 13.6 g/dL [12.0 - 16.0] / 41.5 % [37.0 - 47.0] < 375 K/uL [130 - 400]      BMP  140 mmol/L [135 - 146] | 97 mmol/L[L] [98 - 110] | 26 mg/dL[H] [10 - 20]  4.9 mmol/L [3.5 - 5.0] | 27 mmol/L [17 - 32] | 1.2 mg/dL [0.7 - 1.5]    116 mg/dL[H] [70 - 99]        Imaging Studies:        Opioid Risk Assessment Tool                                                                           Female       Male  Family History  Alcohol                                                              1                3  Illegal drugs                                                       2                3  Rx drugs                                                            4                4    Personal History   Alcohol                                                              3                3  Illegal drugs                                                       4                4  Rx drugs                                                            5                5    Age between 16—45 years                                1                1  History of preadolescent sexual abuse               3                0    Psychological disease  ADD, OCD, bipolar, schizophrenia                      2                2  Depression                                                       1                1    Total Score                                                      0            __    0 - 3 = low risk for future opioid abuse  4 - 7 = moderate risk for future opioid abuse  8+ = high risk for future opioid abuse
seen and examined   24 h events noted   no distress         PAST HISTORY  --------------------------------------------------------------------------------  No significant changes to PMH, PSH, FHx, SHx, unless otherwise noted    ALLERGIES & MEDICATIONS  --------------------------------------------------------------------------------  Allergies    penicillin (Anaphylaxis)    Intolerances      Standing Inpatient Medications  atorvastatin 40 milliGRAM(s) Oral at bedtime  DULoxetine 20 milliGRAM(s) Oral at bedtime  DULoxetine 60 milliGRAM(s) Oral daily  enoxaparin Injectable 40 milliGRAM(s) SubCutaneous every 24 hours  gabapentin 600 milliGRAM(s) Oral at bedtime  gabapentin 300 milliGRAM(s) Oral <User Schedule>  OLANZapine 5 milliGRAM(s) Oral daily  pantoprazole    Tablet 40 milliGRAM(s) Oral before breakfast  polyethylene glycol 3350 17 Gram(s) Oral two times a day  predniSONE   Tablet 80 milliGRAM(s) Oral daily  senna 2 Tablet(s) Oral at bedtime  tiZANidine 4 milliGRAM(s) Oral every 6 hours    PRN Inpatient Medications  acetaminophen     Tablet .. 975 milliGRAM(s) Oral every 6 hours PRN  oxyCODONE    IR 5 milliGRAM(s) Oral four times a day PRN      VITALS/PHYSICAL EXAM  --------------------------------------------------------------------------------  T(C): 36.4 (05-31-25 @ 07:00), Max: 36.4 (05-31-25 @ 00:00)  HR: 80 (05-31-25 @ 07:00) (72 - 87)  BP: 110/73 (05-31-25 @ 07:00) (83/54 - 110/73)  RR: 18 (05-31-25 @ 00:00) (18 - 18)  SpO2: 98% (05-31-25 @ 07:00) (95% - 98%)  Wt(kg): --        05-30-25 @ 07:01  -  05-31-25 @ 07:00  --------------------------------------------------------  IN: 300 mL / OUT: 0 mL / NET: 300 mL      Physical Exam:  	Gen: NAD  	Pulm: decrease BS  B/L  	CV:  S1S2; no rub  	Abd: +distended  	LE: no edema      LABS/STUDIES  --------------------------------------------------------------------------------              11.0   11.40 >-----------<  294      [05-31-25 @ 05:56]              33.2     133  |  97  |  56  ----------------------------<  131      [05-31-25 @ 05:56]  4.7   |  24  |  1.5        Ca     9.2     [05-31-25 @ 05:56]      PT/INR: PT 12.00, INR 1.02       [05-31-25 @ 05:56]  PTT: 26.0       [05-31-25 @ 05:56]      Creatinine Trend:  SCr 1.5 [05-31 @ 05:56]  SCr 2.5 [05-30 @ 06:09]  SCr 1.2 [05-29 @ 05:45]  SCr 0.9 [05-28 @ 06:36]  SCr 0.9 [05-27 @ 23:35]    Urinalysis - [05-31-25 @ 05:56]      Color  / Appearance  / SG  / pH       Gluc 131 / Ketone   / Bili  / Urobili        Blood  / Protein  / Leuk Est  / Nitrite       RBC  / WBC  / Hyaline  / Gran  / Sq Epi  / Non Sq Epi  / Bacteria     Urine Creatinine 150      [05-30-25 @ 16:06]  Urine Protein 20      [05-30-25 @ 16:06]  Urine Sodium <20.0      [05-30-25 @ 16:06]  Urine Urea Nitrogen 400      [05-30-25 @ 16:06]  Urine Potassium 50      [05-30-25 @ 16:06]  Urine Osmolality 324      [05-30-25 @ 16:06]    TSH 2.03      [05-28-25 @ 06:36]      
SUBJECTIVE/OVERNIGHT EVENTS  Today is hospital day 1d. This morning patient was seen and examined at bedside, resting  in bed. .    MEDICATIONS  STANDING MEDICATIONS  atorvastatin 40 milliGRAM(s) Oral at bedtime  DULoxetine 20 milliGRAM(s) Oral at bedtime  DULoxetine 60 milliGRAM(s) Oral daily  enoxaparin Injectable 40 milliGRAM(s) SubCutaneous every 24 hours  gabapentin 300 milliGRAM(s) Oral <User Schedule>  gabapentin 600 milliGRAM(s) Oral daily  hydrochlorothiazide 25 milliGRAM(s) Oral daily  lisinopril 40 milliGRAM(s) Oral daily  OLANZapine 5 milliGRAM(s) Oral daily  pantoprazole    Tablet 40 milliGRAM(s) Oral before breakfast  predniSONE   Tablet 80 milliGRAM(s) Oral daily  senna 2 Tablet(s) Oral at bedtime  tiZANidine 4 milliGRAM(s) Oral every 6 hours    PRN MEDICATIONS  acetaminophen     Tablet .. 975 milliGRAM(s) Oral every 6 hours PRN  ketorolac   Injectable 15 milliGRAM(s) IV Push every 6 hours PRN  morphine  - Injectable 4 milliGRAM(s) IV Push every 4 hours PRN  oxyCODONE    IR 5 milliGRAM(s) Oral four times a day PRN    VITALS  T(F): 98.3 (05-29-25 @ 00:06), Max: 98.7 (05-28-25 @ 20:42)  HR: 85 (05-29-25 @ 05:31) (85 - 115)  BP: 121/71 (05-29-25 @ 05:31) (100/66 - 132/90)  RR: 18 (05-29-25 @ 00:06) (18 - 18)  SpO2: 99% (05-29-25 @ 00:06) (95% - 99%)  POCT Blood Glucose.: 102 mg/dL (05-28-25 @ 21:46)    PHYSICAL EXAM    GENERAL: NAD, lying in bed   HEAD:  Atraumatic, normocephalic  EYES: EOMI,   HEART: Regular rate and rhythm, no murmurs, rubs, or gallops  LUNGS: Unlabored respirations.  Clear to auscultation bilaterally, no crackles, wheezing, or rhonchi  ABDOMEN: Soft, nontender, nondistended  EXTREMITIES: No clubbing, cyanosis, or edema  NERVOUS SYSTEM:  A&Ox3, no focal deficits   SKIN: No rashes or lesions    LABS             13.6   11.69 )-----------( 375      ( 05-29-25 @ 05:45 )             41.5     140  |  97  |  26  -------------------------<  116   05-29-25 @ 05:45  4.9  |  27  |  1.2    Ca      10.5     05-29-25 @ 05:45  Mg     2.1     05-28-25 @ 06:36    TPro  7.3  /  Alb  4.4  /  TBili  0.7  /  DBili  x   /  AST  22  /  ALT  26  /  AlkPhos  90  /  GGT  x     05-28-25 @ 06:36      Troponin T, High Sensitivity Result: 6 ng/L (05-28-25 @ 01:37)  Troponin T, High Sensitivity Result: 7 ng/L (05-27-25 @ 23:35)  Pro-Brain Natriuretic Peptide: <36 pg/mL (05-27-25 @ 23:35)    Urinalysis Basic - ( 29 May 2025 05:45 )    Color: x / Appearance: x / SG: x / pH: x  Gluc: 116 mg/dL / Ketone: x  / Bili: x / Urobili: x   Blood: x / Protein: x / Nitrite: x   Leuk Esterase: x / RBC: x / WBC x   Sq Epi: x / Non Sq Epi: x / Bacteria: x          IMAGING
SUBJECTIVE/OVERNIGHT EVENTS  Today is hospital day 2d. This morning patient was seen and examined at bedside, resting  in bed. .    MEDICATIONS  STANDING MEDICATIONS  atorvastatin 40 milliGRAM(s) Oral at bedtime  DULoxetine 20 milliGRAM(s) Oral at bedtime  DULoxetine 60 milliGRAM(s) Oral daily  enoxaparin Injectable 40 milliGRAM(s) SubCutaneous every 24 hours  gabapentin 600 milliGRAM(s) Oral at bedtime  gabapentin 300 milliGRAM(s) Oral <User Schedule>  OLANZapine 5 milliGRAM(s) Oral daily  pantoprazole    Tablet 40 milliGRAM(s) Oral before breakfast  polyethylene glycol 3350 17 Gram(s) Oral two times a day  predniSONE   Tablet 80 milliGRAM(s) Oral daily  senna 2 Tablet(s) Oral at bedtime  tiZANidine 4 milliGRAM(s) Oral every 6 hours    PRN MEDICATIONS  acetaminophen     Tablet .. 975 milliGRAM(s) Oral every 6 hours PRN  morphine  - Injectable 4 milliGRAM(s) IV Push every 4 hours PRN  oxyCODONE    IR 5 milliGRAM(s) Oral four times a day PRN    VITALS  T(F): 97.3 (05-30-25 @ 07:00), Max: 98.4 (05-29-25 @ 15:00)  HR: 77 (05-30-25 @ 07:00) (74 - 98)  BP: 95/56 (05-30-25 @ 07:00) (91/58 - 97/59)  RR: 18 (05-30-25 @ 00:17) (18 - 18)  SpO2: 97% (05-30-25 @ 07:00) (94% - 98%)  POCT Blood Glucose.: 249 mg/dL (05-30-25 @ 10:51)  POCT Blood Glucose.: 141 mg/dL (05-30-25 @ 07:23)  POCT Blood Glucose.: 184 mg/dL (05-29-25 @ 20:50)  POCT Blood Glucose.: 260 mg/dL (05-29-25 @ 15:03)    PHYSICAL EXAM    GENERAL: NAD, lying in bed   HEAD:  Atraumatic, normocephalic  EYES: EOMI,   HEART: Regular rate and rhythm, no murmurs, rubs, or gallops  LUNGS: Unlabored respirations.  Clear to auscultation bilaterally, no crackles, wheezing, or rhonchi  ABDOMEN: Soft, nontender, nondistended  EXTREMITIES: No clubbing, cyanosis, or edema  NERVOUS SYSTEM:  A&Ox3, no focal deficits   SKIN: No rashes or lesions    LABS             11.5   11.74 )-----------( 301      ( 05-30-25 @ 06:09 )             34.4     133  |  95  |  42  -------------------------<  142   05-30-25 @ 06:09  4.2  |  24  |  2.5    Ca      9.5     05-30-25 @ 06:09        Troponin T, High Sensitivity Result: 6 ng/L (05-28-25 @ 01:37)  Troponin T, High Sensitivity Result: 7 ng/L (05-27-25 @ 23:35)  Pro-Brain Natriuretic Peptide: <36 pg/mL (05-27-25 @ 23:35)    Urinalysis Basic - ( 30 May 2025 06:09 )    Color: x / Appearance: x / SG: x / pH: x  Gluc: 142 mg/dL / Ketone: x  / Bili: x / Urobili: x   Blood: x / Protein: x / Nitrite: x   Leuk Esterase: x / RBC: x / WBC x   Sq Epi: x / Non Sq Epi: x / Bacteria: x          IMAGING
SUBJECTIVE/OVERNIGHT EVENTS  Today is hospital day 6d. This morning patient was seen and examined at bedside, resting comfortably in bed. No acute or major events overnight.    HOSPITAL COURSE  Day 1:   Day 2:   Day 3:     CODE STATUS:    FAMILY COMMUNICATION  Contact date:  Name of person contacted:  Relationship to patient:  Communication details:    MEDICATIONS  STANDING MEDICATIONS  atorvastatin 40 milliGRAM(s) Oral at bedtime  chlorhexidine 2% Cloths 1 Application(s) Topical <User Schedule>  DULoxetine 20 milliGRAM(s) Oral at bedtime  DULoxetine 60 milliGRAM(s) Oral daily  gabapentin 600 milliGRAM(s) Oral at bedtime  gabapentin 300 milliGRAM(s) Oral <User Schedule>  OLANZapine 5 milliGRAM(s) Oral daily  pantoprazole    Tablet 40 milliGRAM(s) Oral before breakfast  polyethylene glycol 3350 17 Gram(s) Oral two times a day  predniSONE   Tablet 40 milliGRAM(s) Oral daily  senna 2 Tablet(s) Oral at bedtime  tiZANidine 4 milliGRAM(s) Oral every 6 hours    PRN MEDICATIONS  acetaminophen     Tablet .. 975 milliGRAM(s) Oral every 6 hours PRN  oxyCODONE    IR 5 milliGRAM(s) Oral four times a day PRN    VITALS  T(F): 98.1 (06-03-25 @ 00:00), Max: 98.4 (06-02-25 @ 14:55)  HR: 77 (06-03-25 @ 00:00) (77 - 88)  BP: 122/80 (06-03-25 @ 00:00) (112/59 - 149/81)  RR: 18 (06-03-25 @ 00:00) (18 - 18)  SpO2: 96% (06-03-25 @ 00:00) (95% - 97%)  POCT Blood Glucose.: 133 mg/dL (06-03-25 @ 07:55)  POCT Blood Glucose.: 159 mg/dL (06-02-25 @ 20:59)  POCT Blood Glucose.: 134 mg/dL (06-02-25 @ 16:35)  POCT Blood Glucose.: 247 mg/dL (06-02-25 @ 11:08)    PHYSICAL EXAM  GENERAL  ( X ) NAD, lying in bed comfortably     (  ) obtunded     (  ) lethargic     (  ) somnolent    HEAD  ( X ) Atraumatic     (  ) hematoma     (  ) laceration (specify location:       )     NECK  ( X ) Supple     (  ) neck stiffness     (  ) nuchal rigidity     (  )  no JVD     (  ) JVD present ( -- cm)    HEART  Rate -->  ( X ) normal rate    (  ) bradycardic    (  ) tachycardic  Rhythm -->  ( X ) regular    (  ) regularly irregular    (  ) irregularly irregular  Murmurs -->  ( X ) normal s1/s2    (  ) systolic murmur    (  ) diastolic murmur    (  ) continuous murmur     (  ) S3 present    (  ) S4 present    LUNGS  (  X)Unlabored respirations     (  ) tachypnea  ( X ) B/L air entry     (  ) decreased breath sounds in:  (location     )    ( X ) no adventitious sound     (  ) crackles     (  ) wheezing      (  ) rhonchi      (specify location:       )  (  ) chest wall tenderness (specify location:       )    ABDOMEN  (X  ) Soft     (  ) tense   |   (  ) nondistended     (  ) distended   |   ( X ) +BS     (  ) hypoactive bowel sounds     (  ) hyperactive bowel sounds  (  X) nontender     (  ) RUQ tenderness     (  ) RLQ tenderness     (  ) LLQ tenderness     (  ) epigastric tenderness     (  ) diffuse tenderness  (  ) Splenomegaly      (  ) Hepatomegaly      (  ) Jaundice     (  ) ecchymosis     EXTREMITIES  (X  ) Normal     (  ) Rash     (  ) ecchymosis     (  ) varicose veins      (  ) pitting edema     (  ) non-pitting edema   (  ) ulceration     (  ) gangrene:     (location:     )    NERVOUS SYSTEM  (X  ) A&Ox3     (  ) confused     (  ) lethargic  CN II-XII:     (  ) Intact     (  ) focal deficits  (Specify:     )   Upper extremities:     (  ) strength X/5     (  ) focal deficit (specify:    )  Lower extremities:     (  ) strength  X/5    (  ) focal deficit (specify:    )    SKIN  ( X ) No rashes or lesions     (  ) maculopapular rash     (  ) pustules     (  ) vesicles     (  ) ulcer     (  ) ecchymosis     (specify location:     )    LABS             11.9   11.17 )-----------( 273      ( 06-02-25 @ 04:35 )             35.4     141  |  103  |  33  -------------------------<  100   06-02-25 @ 04:35  4.9  |  29  |  0.9    Ca      9.5     06-02-25 @ 04:35          Urinalysis Basic - ( 02 Jun 2025 04:35 )    Color: x / Appearance: x / SG: x / pH: x  Gluc: 100 mg/dL / Ketone: x  / Bili: x / Urobili: x   Blood: x / Protein: x / Nitrite: x   Leuk Esterase: x / RBC: x / WBC x   Sq Epi: x / Non Sq Epi: x / Bacteria: x          IMAGING
24H events:    Patient is a 54y old Female who presents with a chief complaint of fall/syncope (31 May 2025 10:04)    Primary diagnosis of Syncope       Today is hospital day 3d. This morning patient was seen and examined at bedside, resting comfortably in bed. No OE.      PAST MEDICAL & SURGICAL HISTORY  HTN (hypertension)    Fibromyalgia    PTSD (post-traumatic stress disorder)    Autoimmune hepatitis  IN REMISSION FOR AT LEAST 3-4YRS    DJD (degenerative joint disease)    HLD (hyperlipidemia)    Anxiety  PTSD    Depression    Obesity  BMI 42    Chronic neck and back pain    Uveitis  B/L WITH CATARACTS    History of endometrial hyperplasia    H/O sciatica    History of liver biopsy    H/O  section    History of dilatation and curettage      SOCIAL HISTORY:  Social History:      ALLERGIES:  penicillin (Anaphylaxis)    MEDICATIONS:  STANDING MEDICATIONS  atorvastatin 40 milliGRAM(s) Oral at bedtime  DULoxetine 20 milliGRAM(s) Oral at bedtime  DULoxetine 60 milliGRAM(s) Oral daily  enoxaparin Injectable 40 milliGRAM(s) SubCutaneous every 24 hours  gabapentin 600 milliGRAM(s) Oral at bedtime  gabapentin 300 milliGRAM(s) Oral <User Schedule>  OLANZapine 5 milliGRAM(s) Oral daily  pantoprazole    Tablet 40 milliGRAM(s) Oral before breakfast  polyethylene glycol 3350 17 Gram(s) Oral two times a day  predniSONE   Tablet 80 milliGRAM(s) Oral daily  senna 2 Tablet(s) Oral at bedtime  tiZANidine 4 milliGRAM(s) Oral every 6 hours    PRN MEDICATIONS  acetaminophen     Tablet .. 975 milliGRAM(s) Oral every 6 hours PRN  oxyCODONE    IR 5 milliGRAM(s) Oral four times a day PRN    VITALS:   T(F): 97.6  HR: 80  BP: 110/73  RR: 18  SpO2: 98%    PHYSICAL EXAM:  GENERAL: NAD, well-groomed, well-developed  HEAD:  Atraumatic, Normocephalic  EYES: EOMI  NECK: Supple  NERVOUS SYSTEM:  Alert & Oriented X3, non focal   CHEST/LUNG: Clear to auscultation bilaterally; No rales, rhonchi, wheezing, or rubs  HEART: Regular rate and rhythm; No murmurs, rubs, or gallops  ABDOMEN: Soft, Nontender, Nondistended; Bowel sounds present  EXTREMITIES:  2+ Peripheral Pulses, No clubbing, cyanosis, or edema  LYMPH: No lymphadenopathy noted  SKIN: No rashes or lesions  LABS:                        11.0   11.40 )-----------( 294      ( 31 May 2025 05:56 )             33.2         133[L]  |  97[L]  |  56[H]  ----------------------------<  131[H]  4.7   |  24  |  1.5    Ca    9.2      31 May 2025 05:56      PT/INR - ( 31 May 2025 05:56 )   PT: 12.00 sec;   INR: 1.02 ratio         PTT - ( 31 May 2025 05:56 )  PTT:26.0 sec  Urinalysis Basic - ( 31 May 2025 05:56 )    Color: x / Appearance: x / SG: x / pH: x  Gluc: 131 mg/dL / Ketone: x  / Bili: x / Urobili: x   Blood: x / Protein: x / Nitrite: x   Leuk Esterase: x / RBC: x / WBC x   Sq Epi: x / Non Sq Epi: x / Bacteria: x
HPI  Patient is a 54y old Female who presents with a chief complaint of fall/syncope (31 May 2025 12:40)    Currently admitted to medicine with the primary diagnosis of Syncope       Today is hospital day 4d.     INTERVAL HPI / OVERNIGHT EVENTS:  Patient was seen and examined at bedside  sciatica pain is worsened today; but with medications its slowly coming down  Denies any complains of chest pain or shortness of breath  Denies any abdominal pain/nausea/vomiting  d/w steroid dosing and comfortable in tapering      PAST MEDICAL & SURGICAL HISTORY  HTN (hypertension)    Fibromyalgia    PTSD (post-traumatic stress disorder)    Autoimmune hepatitis  IN REMISSION FOR AT LEAST 3-4YRS    DJD (degenerative joint disease)    HLD (hyperlipidemia)    Anxiety  PTSD    Depression    Obesity  BMI 42    Chronic neck and back pain    Uveitis  B/L WITH CATARACTS    History of endometrial hyperplasia    H/O sciatica    History of liver biopsy    H/O  section    History of dilatation and curettage      ALLERGIES  penicillin (Anaphylaxis)    MEDICATIONS  STANDING MEDICATIONS  atorvastatin 40 milliGRAM(s) Oral at bedtime  DULoxetine 20 milliGRAM(s) Oral at bedtime  DULoxetine 60 milliGRAM(s) Oral daily  enoxaparin Injectable 40 milliGRAM(s) SubCutaneous every 24 hours  gabapentin 600 milliGRAM(s) Oral at bedtime  gabapentin 300 milliGRAM(s) Oral <User Schedule>  OLANZapine 5 milliGRAM(s) Oral daily  pantoprazole    Tablet 40 milliGRAM(s) Oral before breakfast  polyethylene glycol 3350 17 Gram(s) Oral two times a day  senna 2 Tablet(s) Oral at bedtime  tiZANidine 4 milliGRAM(s) Oral every 6 hours    PRN MEDICATIONS  acetaminophen     Tablet .. 975 milliGRAM(s) Oral every 6 hours PRN  oxyCODONE    IR 5 milliGRAM(s) Oral four times a day PRN    VITALS:  T(F): 97.9  HR: 73  BP: 124/70  RR: 18  SpO2: 98%    PHYSICAL EXAM  GEN: no distress, comfortable  PULM: normal respiartion  EXT: No lower extremity edema  NEURO: A&Ox3, able to ambulate with help of cane    LABS                        12.8   12.63 )-----------( 331      ( 2025 05:54 )             38.6     06-01    137  |  101  |  45[H]  ----------------------------<  98  4.7   |  21  |  1.1    Ca    9.7      2025 05:54      PT/INR - ( 31 May 2025 05:56 )   PT: 12.00 sec;   INR: 1.02 ratio         PTT - ( 31 May 2025 05:56 )  PTT:26.0 sec  Urinalysis Basic - ( 2025 05:54 )    Color: x / Appearance: x / SG: x / pH: x  Gluc: 98 mg/dL / Ketone: x  / Bili: x / Urobili: x   Blood: x / Protein: x / Nitrite: x   Leuk Esterase: x / RBC: x / WBC x   Sq Epi: x / Non Sq Epi: x / Bacteria: x                RADIOLOGY    
SUBJECTIVE/OVERNIGHT EVENTS  Today is hospital day 5d. This morning patient was seen and examined at bedside, resting comfortably in bed. No acute or major events overnight.    HOSPITAL COURSE  Day 1:   Day 2:   Day 3:     CODE STATUS:    FAMILY COMMUNICATION  Contact date:  Name of person contacted:  Relationship to patient:  Communication details:    MEDICATIONS  STANDING MEDICATIONS  atorvastatin 40 milliGRAM(s) Oral at bedtime  chlorhexidine 2% Cloths 1 Application(s) Topical <User Schedule>  DULoxetine 20 milliGRAM(s) Oral at bedtime  DULoxetine 60 milliGRAM(s) Oral daily  enoxaparin Injectable 40 milliGRAM(s) SubCutaneous every 24 hours  gabapentin 600 milliGRAM(s) Oral at bedtime  gabapentin 300 milliGRAM(s) Oral <User Schedule>  OLANZapine 5 milliGRAM(s) Oral daily  pantoprazole    Tablet 40 milliGRAM(s) Oral before breakfast  polyethylene glycol 3350 17 Gram(s) Oral two times a day  predniSONE   Tablet 40 milliGRAM(s) Oral daily  senna 2 Tablet(s) Oral at bedtime  tiZANidine 4 milliGRAM(s) Oral every 6 hours    PRN MEDICATIONS  acetaminophen     Tablet .. 975 milliGRAM(s) Oral every 6 hours PRN  oxyCODONE    IR 5 milliGRAM(s) Oral four times a day PRN    VITALS  T(F): 98.2 (06-02-25 @ 07:58), Max: 98.4 (06-01-25 @ 23:37)  HR: 69 (06-02-25 @ 07:58) (69 - 89)  BP: 110/78 (06-02-25 @ 07:58) (110/78 - 127/82)  RR: 18 (06-02-25 @ 07:58) (18 - 18)  SpO2: 97% (06-02-25 @ 07:58) (97% - 98%)  POCT Blood Glucose.: 152 mg/dL (06-02-25 @ 07:42)  POCT Blood Glucose.: 145 mg/dL (06-01-25 @ 21:33)  POCT Blood Glucose.: 149 mg/dL (06-01-25 @ 16:44)  POCT Blood Glucose.: 177 mg/dL (06-01-25 @ 11:34)    PHYSICAL EXAM  GENERAL  (  x) NAD, lying in bed comfortably     (  ) obtunded     (  ) lethargic     (  ) somnolent    HEAD  (x  ) Atraumatic     (  ) hematoma     (  ) laceration (specify location:       )     NECK  (  X) Supple     (  ) neck stiffness     (  ) nuchal rigidity     (  )  no JVD     (  ) JVD present ( -- cm)    HEART  Rate -->  (X ) normal rate    (  ) bradycardic    (  ) tachycardic  Rhythm -->  (X  ) regular    (  ) regularly irregular    (  ) irregularly irregular  Murmurs -->  (  X) normal s1/s2    (  ) systolic murmur    (  ) diastolic murmur    (  ) continuous murmur     (  ) S3 present    (  ) S4 present    LUNGS  ( X )Unlabored respirations     (  ) tachypnea  (  ) B/L air entry     (X  ) decreased breath sounds in:  (location     )    ( X ) no adventitious sound     (  ) crackles     (  ) wheezing      (  ) rhonchi      (specify location:       )  (  ) chest wall tenderness (specify location:       )    ABDOMEN  ( X ) Soft     (  ) tense   |   (  ) nondistended     (  ) distended   |   ( X ) +BS     (  ) hypoactive bowel sounds     (  ) hyperactive bowel sounds  (  X) nontender     (  ) RUQ tenderness     (  ) RLQ tenderness     (  ) LLQ tenderness     (  ) epigastric tenderness     (  ) diffuse tenderness  (  ) Splenomegaly      (  ) Hepatomegaly      (  ) Jaundice     (  ) ecchymosis     EXTREMITIES  (X  ) Normal     (  ) Rash     (  ) ecchymosis     (  ) varicose veins      (  ) pitting edema     (  ) non-pitting edema   (  ) ulceration     (  ) gangrene:     (location:     )    NERVOUS SYSTEM  X(  ) A&Ox3     (  ) confused     (  ) lethargic  CN II-XII:     (  ) Intact     (  ) focal deficits  (Specify:     )   Upper extremities:     (  ) strength X/5     (  ) focal deficit (specify:    )  Lower extremities:     (  ) strength  X/5    (  ) focal deficit (specify:    )    SKIN  (X  ) No rashes or lesions     (  ) maculopapular rash     (  ) pustules     (  ) vesicles     (  ) ulcer     (  ) ecchymosis     (specify location:     )      LABS             11.9   11.17 )-----------( 273      ( 06-02-25 @ 04:35 )             35.4     141  |  103  |  33  -------------------------<  100   06-02-25 @ 04:35  4.9  |  29  |  0.9    Ca      9.5     06-02-25 @ 04:35          Urinalysis Basic - ( 02 Jun 2025 04:35 )    Color: x / Appearance: x / SG: x / pH: x  Gluc: 100 mg/dL / Ketone: x  / Bili: x / Urobili: x   Blood: x / Protein: x / Nitrite: x   Leuk Esterase: x / RBC: x / WBC x   Sq Epi: x / Non Sq Epi: x / Bacteria: x          IMAGING

## 2025-06-03 NOTE — DISCHARGE NOTE PROVIDER - CARE PROVIDER_API CALL
Jd Goodrich  Internal Medicine  [address] [place] [id number]  Phone: [phone]  Fax: [phone]  Follow Up Time: 1 week

## 2025-06-03 NOTE — DISCHARGE NOTE PROVIDER - NSDCMRMEDTOKEN_GEN_ALL_CORE_FT
DULoxetine 20 mg oral delayed release capsule: 1 cap(s) orally once a day (at bedtime)  DULoxetine 60 mg oral delayed release capsule: 1 cap(s) orally once a day (in the morning)  famotidine 40 mg oral tablet: 1 tab(s) orally once a day (at bedtime)  ferrous sulfate 325 mg (65 mg elemental iron) oral delayed release tablet: 1 tab(s) orally once a day  gabapentin 300 mg oral capsule: 1 cap(s) orally twice a day after breakfast and lunch  gabapentin 600 mg oral tablet: 1 tab(s) orally once a day (at bedtime)  hydroCHLOROthiazide 25 mg oral tablet: 1 tab(s) orally once a day  lidocaine 4% topical film: Apply topically to affected area 2 times a day  lisinopril 40 mg oral tablet: 1 tab(s) orally once a day  lovastatin 40 mg oral tablet, extended release: 1 tab(s) orally once a day (at bedtime)  OLANZapine 5 mg oral tablet: 1 tab(s) orally once a day  tiZANidine 4 mg oral capsule: 1 cap(s) orally every 6 hours   DULoxetine 20 mg oral delayed release capsule: 1 cap(s) orally once a day (at bedtime)  DULoxetine 60 mg oral delayed release capsule: 1 cap(s) orally once a day (in the morning)  famotidine 40 mg oral tablet: 1 tab(s) orally once a day (at bedtime)  ferrous sulfate 325 mg (65 mg elemental iron) oral delayed release tablet: 1 tab(s) orally once a day  gabapentin 300 mg oral capsule: 1 cap(s) orally twice a day after breakfast and lunch  gabapentin 600 mg oral tablet: 1 tab(s) orally once a day (at bedtime)  hydroCHLOROthiazide 25 mg oral tablet: 1 tab(s) orally once a day  lidocaine 4% topical film: Apply topically to affected area 2 times a day  lisinopril 40 mg oral tablet: 1 tab(s) orally once a day  lovastatin 40 mg oral tablet, extended release: 1 tab(s) orally once a day (at bedtime)  OLANZapine 5 mg oral tablet: 1 tab(s) orally once a day  Pharbetol 325 mg oral tablet: 3 tab(s) orally every 6 hours as needed for Mild Pain (1 - 3)  polyethylene glycol 3350 oral powder for reconstitution: 17 gram(s) orally 2 times a day as needed for  constipation  senna 8.6 mg oral tablet: 1 tab(s) orally 2 times a day as needed for  constipation  tiZANidine 4 mg oral capsule: 1 cap(s) orally every 6 hours as needed for  muscle spasm   DULoxetine 20 mg oral delayed release capsule: 1 cap(s) orally once a day (at bedtime)  DULoxetine 60 mg oral delayed release capsule: 1 cap(s) orally once a day (in the morning)  famotidine 40 mg oral tablet: 1 tab(s) orally once a day (at bedtime)  ferrous sulfate 325 mg (65 mg elemental iron) oral delayed release tablet: 1 tab(s) orally once a day  gabapentin 300 mg oral capsule: 1 cap(s) orally twice a day after breakfast and lunch  gabapentin 600 mg oral tablet: 1 tab(s) orally once a day (at bedtime)  lidocaine 4% topical film: Apply topically to affected area 2 times a day  lovastatin 40 mg oral tablet, extended release: 1 tab(s) orally once a day (at bedtime)  OLANZapine 5 mg oral tablet: 1 tab(s) orally once a day  Pharbetol 325 mg oral tablet: 3 tab(s) orally every 6 hours as needed for Mild Pain (1 - 3)  polyethylene glycol 3350 oral powder for reconstitution: 17 gram(s) orally 2 times a day as needed for  constipation  senna 8.6 mg oral tablet: 1 tab(s) orally 2 times a day as needed for  constipation  tiZANidine 4 mg oral capsule: 1 cap(s) orally every 6 hours as needed for  muscle spasm   DULoxetine 20 mg oral delayed release capsule: 1 cap(s) orally once a day (at bedtime)  DULoxetine 60 mg oral delayed release capsule: 1 cap(s) orally once a day (in the morning)  famotidine 40 mg oral tablet: 1 tab(s) orally once a day (at bedtime)  ferrous sulfate 325 mg (65 mg elemental iron) oral delayed release tablet: 1 tab(s) orally once a day  gabapentin 300 mg oral capsule: 1 cap(s) orally twice a day after breakfast and lunch  gabapentin 600 mg oral tablet: 1 tab(s) orally once a day (at bedtime)  lidocaine 4% topical film: Apply topically to affected area 2 times a day  lovastatin 40 mg oral tablet, extended release: 1 tab(s) orally once a day (at bedtime)  OLANZapine 5 mg oral tablet: 1 tab(s) orally once a day  Pharbetol 325 mg oral tablet: 3 tab(s) orally every 6 hours as needed for Mild Pain (1 - 3)  polyethylene glycol 3350 oral powder for reconstitution: 17 gram(s) orally 2 times a day as needed for  constipation  predniSONE 10 mg oral tablet: 2 tab(s) orally once a day for 2 days then 1 tab once daily for 2 days  senna 8.6 mg oral tablet: 1 tab(s) orally 2 times a day as needed for  constipation  tiZANidine 4 mg oral capsule: 1 cap(s) orally every 6 hours as needed for  muscle spasm

## 2025-06-03 NOTE — DISCHARGE NOTE PROVIDER - CARE PROVIDERS DIRECT ADDRESSES
[email or web address]

## 2025-06-03 NOTE — DISCHARGE NOTE PROVIDER - NSDCCPCAREPLAN_GEN_ALL_CORE_FT
PRINCIPAL DISCHARGE DIAGNOSIS  Diagnosis: Syncope  Assessment and Plan of Treatment: You are being discharged home today. Please continue the prednisone taper as directed, along with your prescribed painkillers, olanzapine, and tizanidine. Avoid NSAIDs such as ibuprofen or naproxen. You should follow up with your primary care physician within one week and with pain management. Monitor your urine output and report any decrease or difficulty urinating to your physician. You should also continue your atorvastatin. If you experience any worsening back pain, lightheadedness, dizziness, fever, chills, nausea, vomiting, diarrhea, or any other concerning symptoms, please return to the emergency department. Please take your medications as prescribed and follow the instructions given to you regarding your health. Keep follow up appointments with your doctors as instructed.      SECONDARY DISCHARGE DIAGNOSES  Diagnosis: Back pain  Assessment and Plan of Treatment:      PRINCIPAL DISCHARGE DIAGNOSIS  Diagnosis: Syncope  Assessment and Plan of Treatment: You are being discharged home today. Please continue  your prescribed painkillers, olanzapine, and tizanidine. Avoid NSAIDs such as ibuprofen or naproxen. You should follow up with your primary care physician within one week and with pain management. Monitor your urine output and report any decrease or difficulty urinating to your physician. You should also continue your atorvastatin. If you experience any worsening back pain, lightheadedness, dizziness, fever, chills, nausea, vomiting, diarrhea, or any other concerning symptoms, please return to the emergency department. Please take your medications as prescribed and follow the instructions given to you regarding your health. Keep follow up appointments with your doctors as instructed.      SECONDARY DISCHARGE DIAGNOSES  Diagnosis: Back pain  Assessment and Plan of Treatment:

## 2025-06-03 NOTE — PROGRESS NOTE ADULT - ASSESSMENT
54-year-old female PMH hypertension, hyperlipidemia, diabetes, degenerative disc disease, herniated disks, fibromyalgia, PTSD, left scitaica coming to ED for severe left lower back/buttock pain radiating down her posterior aspect of her left leg. Patient reports exacerbation of left-sided sciatica since mid March that worsened over the last week. She follows with pain management and has been taking steroids/gabapentin/Cymbalta/Excedrin with no relief.  Patient reports that she had vomiting and diarrhea due to "stomach bug" 4 days ago that has now resolved and that she had a syncopal? episode in the bathroom while trying to vomit but was able to break her fall by holding on to the toilet. She felt lightheaded and dizzy at the time.  Denies headache, head trauma, neck pain, chest pain, abdominal pain, no shortness of breath, nausea vomiting diarrhea constipation, unilateral numbness/weakness    # severe left leg pain due to sciatica pain  - started prednisone 80mg qd- taper to 40 mg daily   - c/w gabapentin 300mg in AM, 300mg at noon  and 600mg at bedtime  - DC TORADOL, AVOID NSAIDS   - c/w duloxetine 60mg in AM and 20mg in PM  - oxycodone PRN  - Pain management c/s: MRI L spine shows multilevel degenerative changes, worse at L4-L5 with mild impingement of the descending left L5 nerve root by shallow disc bulge and facet hypertrophy, and L5-S1 with central disc protrusion abutting the descending bilateral S1 nerve roots. Incidental annular fissure noted at L5-S1.   - pain management -> Epidural injection 6/2   - Patient reports improvement in pain     #orthostatic hypotension - improved  #syncope  #Vertigo  - BP continues to have dizzy spells, BP soft with + orthostatics   - give IVF   - BP meds dc'ed- hold lisinopril and HCTZ  -Recheck orthostats - negative    #REBECCA- improved  - possibly 2/2 to hypovolemia/ NSAIDs  - IVF 1L bolus given 05/31  - nephro evaluated  - RBUS, UA with microscopic add on -> NEGATIVE  - Monitor BMP    #HTN  - hold BP meds - stable today    #Depression  #PTSD  #Fibromyalgia  - c/w olanzapine 5mg qd  - c/w duloxetine 60 in am and 20 in PM  - tizanidine    #DL  -  c/w atorvastatin    DVT PPX: lovenox    pending: pain management follow up for intervention, pain control  CODE STATUS: FULL CODE

## 2025-06-03 NOTE — DISCHARGE NOTE PROVIDER - HOSPITAL COURSE
54-year-old female with a history of hypertension, hyperlipidemia, diabetes, degenerative disc disease, herniated discs, fibromyalgia, PTSD, and left sciatica presented to the ED with severe left lower back and buttock pain radiating down the posterior aspect of her left leg. This represented an exacerbation of chronic left sciatica that had worsened over the previous week. Her home medications for pain management (steroids, gabapentin, Cymbalta, and Excedrin) provided no relief. She also reported a recent episode of vomiting and diarrhea, resolved at the time of presentation, and a near-syncopal episode in the bathroom during that illness. She denied any other symptoms. On examination, orthostatic hypotension was noted. Initial blood pressure was low, and she had a positive orthostatic test. Given her recent gastrointestinal illness and the orthostatic hypotension, she received IV fluids. Her home antihypertensives (lisinopril and HCTZ) were held. She was also found to have acute kidney injury (REBECCA), likely secondary to dehydration and NSAID use. Nephrology was consulted, and a renal ultrasound and urinalysis were negative. Her sciatica was managed with prednisone (80 mg daily, tapering to 40 mg daily), gabapentin (300 mg in the morning, 300 mg at noon, and 600 mg at bedtime), duloxetine (60 mg in the morning and 20 mg in the evening), and oxycodone as needed. Toradol/NSAIDs were discontinued. Pain management was consulted, and an epidural steroid injection was scheduled. Her REBECCA improved with IV fluids and discontinuation of NSAIDs. Her orthostatic hypotension also resolved with IV fluid hydration, and her blood pressure subsequently stabilized without her home medications. Her depression, PTSD, and fibromyalgia were managed with olanzapine 5mg daily, duloxetine, and tizanidine. She continued on atorvastatin for hyperlipidemia. 54-year-old female with a history of hypertension, hyperlipidemia, diabetes, degenerative disc disease, herniated discs, fibromyalgia, PTSD, and left sciatica presented to the ED with severe left lower back and buttock pain radiating down the posterior aspect of her left leg. This represented an exacerbation of chronic left sciatica that had worsened over the previous week. Her home medications for pain management (steroids, gabapentin, Cymbalta, and Excedrin) provided no relief. She also reported a recent episode of vomiting and diarrhea, resolved at the time of presentation, and a near-syncopal episode in the bathroom during that illness. She denied any other symptoms. On examination, orthostatic hypotension was noted. Initial blood pressure was low, and she had a positive orthostatic test. Given her recent gastrointestinal illness and the orthostatic hypotension, she received IV fluids. Her home antihypertensives (lisinopril and HCTZ) were held. She was also found to have acute kidney injury (REBECCA), likely secondary to dehydration and NSAID use. Nephrology was consulted, and a renal ultrasound and urinalysis were negative. Her sciatica was managed with prednisone (80 mg daily, tapering to 40 mg daily), gabapentin (300 mg in the morning, 300 mg at noon, and 600 mg at bedtime), duloxetine (60 mg in the morning and 20 mg in the evening), and oxycodone as needed. Toradol/NSAIDs were discontinued. Pain management was consulted, and an epidural steroid injection was scheduled. Her REBECCA improved with IV fluids and discontinuation of NSAIDs. Her orthostatic hypotension also resolved with IV fluid hydration, and her blood pressure subsequently stabilized without her home medications. Her depression, PTSD, and fibromyalgia were managed with olanzapine 5mg daily, duloxetine, and tizanidine. She continued on atorvastatin for hyperlipidemia.    # Lumbar stenosis with radiculopathy  # Sciatica  # Degenerative disc disease, herniated disc  # Fibromyalgia  - MR Lumbar Spine No Cont (05.30.25 @ 14:42) >Multilevel degenerative changes, worse at L4-L5 with mild impingement of the descending left L5 nerve root by shallow disc bulge and facet hypertrophy, and L5-S1 with central disc protrusion abutting the descending bilateral S1 nerve roots., Incidental annular fissure noted at L5-S1  - s/p eidural steroid injection on 6/2  - c/w neurontin  - c/w duloxetine   - c/w tylenol, tizanidine  - outpt F/u with pain management  - outpt PT    # Syncope sec to orthostatic hypotension  - Discontinue   lisinopril and HCTZ  -Recheck orthostats - negative    # Acute kidney injury, prerenal  - S/p IV fluids  - US Kidney and Bladder (05.30.25 @ 18:05) >No sonographic evidence of hydronephrosis.    #Hypertension- stable off meds    # Dyslipidemia  - c/w statin    # Depression  # PTSD  - c/w home meds

## 2025-06-04 ENCOUNTER — TRANSCRIPTION ENCOUNTER (OUTPATIENT)
Age: 55
End: 2025-06-04

## 2025-06-04 VITALS
RESPIRATION RATE: 18 BRPM | TEMPERATURE: 98 F | DIASTOLIC BLOOD PRESSURE: 88 MMHG | HEART RATE: 80 BPM | SYSTOLIC BLOOD PRESSURE: 138 MMHG | OXYGEN SATURATION: 96 %

## 2025-06-04 LAB — GLUCOSE BLDC GLUCOMTR-MCNC: 154 MG/DL — HIGH (ref 70–99)

## 2025-06-04 PROCEDURE — 99239 HOSP IP/OBS DSCHRG MGMT >30: CPT

## 2025-06-04 RX ORDER — PREDNISONE 20 MG/1
2 TABLET ORAL
Qty: 6 | Refills: 0
Start: 2025-06-04

## 2025-06-04 RX ORDER — ACETAMINOPHEN 500 MG/5ML
3 LIQUID (ML) ORAL
Qty: 84 | Refills: 0
Start: 2025-06-04 | End: 2025-06-10

## 2025-06-04 RX ORDER — SENNA 187 MG
1 TABLET ORAL
Qty: 60 | Refills: 0
Start: 2025-06-04 | End: 2025-07-03

## 2025-06-04 RX ORDER — POLYETHYLENE GLYCOL 3350 17 G/17G
17 POWDER, FOR SOLUTION ORAL
Qty: 1020 | Refills: 0
Start: 2025-06-04 | End: 2025-07-03

## 2025-06-04 RX ORDER — TIZANIDINE 4 MG/1
1 TABLET ORAL
Qty: 28 | Refills: 0
Start: 2025-06-04 | End: 2025-06-10

## 2025-06-04 RX ADMIN — TIZANIDINE 4 MILLIGRAM(S): 4 TABLET ORAL at 05:07

## 2025-06-04 RX ADMIN — Medication 40 MILLIGRAM(S): at 05:08

## 2025-06-04 RX ADMIN — GABAPENTIN 300 MILLIGRAM(S): 400 CAPSULE ORAL at 05:07

## 2025-06-04 RX ADMIN — TIZANIDINE 4 MILLIGRAM(S): 4 TABLET ORAL at 12:00

## 2025-06-04 RX ADMIN — Medication 1 APPLICATION(S): at 05:08

## 2025-06-04 RX ADMIN — PREDNISONE 40 MILLIGRAM(S): 20 TABLET ORAL at 05:07

## 2025-06-04 RX ADMIN — DULOXETINE 60 MILLIGRAM(S): 20 CAPSULE, DELAYED RELEASE ORAL at 12:48

## 2025-06-04 RX ADMIN — OXYCODONE HYDROCHLORIDE 5 MILLIGRAM(S): 30 TABLET ORAL at 13:02

## 2025-06-04 RX ADMIN — GABAPENTIN 300 MILLIGRAM(S): 400 CAPSULE ORAL at 12:00

## 2025-06-04 RX ADMIN — OXYCODONE HYDROCHLORIDE 5 MILLIGRAM(S): 30 TABLET ORAL at 10:49

## 2025-06-04 RX ADMIN — POLYETHYLENE GLYCOL 3350 17 GRAM(S): 17 POWDER, FOR SOLUTION ORAL at 05:07

## 2025-06-04 NOTE — DISCHARGE NOTE NURSING/CASE MANAGEMENT/SOCIAL WORK - PATIENT PORTAL LINK FT
You can access the FollowMyHealth Patient Portal offered by [facility] by registering at the following website: [email or web address]. By joining [employer]’s FollowMyHealth portal, you will also be able to view your health information using other applications (apps) compatible with our system.

## 2025-06-04 NOTE — DISCHARGE NOTE NURSING/CASE MANAGEMENT/SOCIAL WORK - FINANCIAL ASSISTANCE
[facility] provides services at a reduced cost to those who are determined to be eligible through [facility]’s financial assistance program. Information regarding [facility]’s financial assistance program can be found by going to [email or web address] or by calling [phone].

## 2025-06-05 ENCOUNTER — APPOINTMENT (OUTPATIENT)
Dept: PAIN MANAGEMENT | Facility: CLINIC | Age: 55
End: 2025-06-05

## 2025-06-12 DIAGNOSIS — E78.5 HYPERLIPIDEMIA, UNSPECIFIED: ICD-10-CM

## 2025-06-12 DIAGNOSIS — M48.061 SPINAL STENOSIS, LUMBAR REGION WITHOUT NEUROGENIC CLAUDICATION: ICD-10-CM

## 2025-06-12 DIAGNOSIS — M54.16 RADICULOPATHY, LUMBAR REGION: ICD-10-CM

## 2025-06-12 DIAGNOSIS — I10 ESSENTIAL (PRIMARY) HYPERTENSION: ICD-10-CM

## 2025-06-12 DIAGNOSIS — R42 DIZZINESS AND GIDDINESS: ICD-10-CM

## 2025-06-12 DIAGNOSIS — M54.32 SCIATICA, LEFT SIDE: ICD-10-CM

## 2025-06-12 DIAGNOSIS — E86.1 HYPOVOLEMIA: ICD-10-CM

## 2025-06-12 DIAGNOSIS — E66.9 OBESITY, UNSPECIFIED: ICD-10-CM

## 2025-06-12 DIAGNOSIS — K59.00 CONSTIPATION, UNSPECIFIED: ICD-10-CM

## 2025-06-12 DIAGNOSIS — R44.3 HALLUCINATIONS, UNSPECIFIED: ICD-10-CM

## 2025-06-12 DIAGNOSIS — N17.9 ACUTE KIDNEY FAILURE, UNSPECIFIED: ICD-10-CM

## 2025-06-12 DIAGNOSIS — F43.10 POST-TRAUMATIC STRESS DISORDER, UNSPECIFIED: ICD-10-CM

## 2025-06-12 DIAGNOSIS — F32.A DEPRESSION, UNSPECIFIED: ICD-10-CM

## 2025-06-12 DIAGNOSIS — M79.7 FIBROMYALGIA: ICD-10-CM

## 2025-06-12 DIAGNOSIS — T38.0X5A ADVERSE EFFECT OF GLUCOCORTICOIDS AND SYNTHETIC ANALOGUES, INITIAL ENCOUNTER: ICD-10-CM

## 2025-06-12 DIAGNOSIS — E86.0 DEHYDRATION: ICD-10-CM

## 2025-06-12 DIAGNOSIS — Z88.0 ALLERGY STATUS TO PENICILLIN: ICD-10-CM

## 2025-06-12 DIAGNOSIS — I95.1 ORTHOSTATIC HYPOTENSION: ICD-10-CM

## 2025-07-08 ENCOUNTER — APPOINTMENT (OUTPATIENT)
Dept: PAIN MANAGEMENT | Facility: CLINIC | Age: 55
End: 2025-07-08
Payer: MEDICAID

## 2025-07-08 PROCEDURE — 95886 MUSC TEST DONE W/N TEST COMP: CPT

## 2025-07-08 PROCEDURE — 95912 NRV CNDJ TEST 11-12 STUDIES: CPT

## 2025-07-10 ENCOUNTER — APPOINTMENT (OUTPATIENT)
Dept: PAIN MANAGEMENT | Facility: CLINIC | Age: 55
End: 2025-07-10

## 2025-07-10 PROBLEM — M51.16 INTERVERTEBRAL DISC DISORDERS WITH RADICULOPATHY, LUMBAR REGION: Status: ACTIVE | Noted: 2025-07-10

## 2025-07-10 PROCEDURE — 99214 OFFICE O/P EST MOD 30 MIN: CPT

## 2025-07-10 RX ORDER — TIZANIDINE 4 MG/1
4 TABLET ORAL 3 TIMES DAILY
Qty: 90 | Refills: 1 | Status: ACTIVE | COMMUNITY
Start: 2025-07-10 | End: 1900-01-01